# Patient Record
Sex: FEMALE
[De-identification: names, ages, dates, MRNs, and addresses within clinical notes are randomized per-mention and may not be internally consistent; named-entity substitution may affect disease eponyms.]

---

## 2019-03-15 ENCOUNTER — HOSPITAL ENCOUNTER (EMERGENCY)
Dept: HOSPITAL 97 - ER | Age: 59
Discharge: HOME | End: 2019-03-15
Payer: COMMERCIAL

## 2019-03-15 DIAGNOSIS — M18.11: Primary | ICD-10-CM

## 2019-03-15 DIAGNOSIS — J45.909: ICD-10-CM

## 2019-03-15 PROCEDURE — 93005 ELECTROCARDIOGRAM TRACING: CPT

## 2019-03-15 PROCEDURE — 99283 EMERGENCY DEPT VISIT LOW MDM: CPT

## 2019-03-15 PROCEDURE — 96372 THER/PROPH/DIAG INJ SC/IM: CPT

## 2019-03-15 NOTE — ER
Nurse's Notes                                                                                     

 [facility]                                                                

Name: Kiki Lockhart                                                                                

Age: 59 yrs                                                                                       

Sex: Female                                                                                       

: 1960                                                                                   

MRN: [id number]                                                                                   

Arrival Date: 03/15/2019                                                                          

Time: 20:03                                                                                       

Account#: [id number]                                                                            

Bed 13                                                                                            

Private MD:                                                                                       

Diagnosis: Osteoarthritis of first carpometacarpal joint, unspecified                             

                                                                                                  

Presentation:                                                                                     

03/15                                                                                             

20:07 Presenting complaint: Patient states: "yesterday I woke up and my arm was hurting in my aj1 

      right wrist. My  gave me this wrist thing (splint) but it's still                    

      hurting."Denies injury to right hand. Transition of care: patient was not received from     

      another setting of care. Onset of symptoms was 2019. Risk Assessment: Do you      

      want to hurt yourself or someone else? Patient reports no desire to harm self or            

      others. Initial Sepsis Screen: Does the patient meet any 2 criteria? HR > 90 bpm. No.       

      Patient's initial sepsis screen is negative. Does the patient have a suspected source       

      of infection? No. Patient's initial sepsis screen is negative. Care prior to arrival:       

      None.                                                                                       

20:07 Method Of Arrival: Ambulatory                                                           aj1 

20:07 Acuity: ANDRE 4                                                                           aj1 

                                                                                                  

Triage Assessment:                                                                                

20:10 General: Appears in no apparent distress. uncomfortable, Behavior is calm, cooperative, aj1 

      appropriate for age. Pain: Complains of pain in dorsal aspect of right wrist and palmar     

      aspect of right wrist Pain currently is 10 out of 10 on a pain scale. Neuro: Level of       

      Consciousness is awake, alert, obeys commands. Cardiovascular: Patient's skin is warm       

      and dry. Respiratory: Airway is patent Respiratory effort is even, unlabored,               

      Respiratory pattern is regular, symmetrical.                                                

                                                                                                  

Historical:                                                                                       

- Allergies:                                                                                      

20:10 No Known Allergies;                                                                     aj1 

- Home Meds:                                                                                      

20:10 Advair Diskus Inhl [Active]; Singulair Oral [Active];                                   aj1 

- PMHx:                                                                                           

20:10 Asthma;                                                                                 aj1 

                                                                                                  

- Immunization history:: Flu vaccine is not up to date.                                           

- Social history:: Smoking status: Patient/guardian denies using tobacco.                         

- Ebola Screening: : Patient denies travel to an Ebola-affected area in the 21 days               

  before illness onset.                                                                           

                                                                                                  

                                                                                                  

Screenin:20 Abuse screen: Denies threats or abuse. Nutritional screening: No deficits noted.        jb4 

      Tuberculosis screening: No symptoms or risk factors identified. Fall Risk None              

      identified.                                                                                 

                                                                                                  

Assessment:                                                                                       

20:20 General: Appears in no apparent distress. uncomfortable, Behavior is calm, cooperative, jb4 

      appropriate for age. Pain: Complains of pain in right wrist and thumb Pain radiates to      

      right hand Pain currently is 8 out of 10 on a pain scale. Neuro: Level of Consciousness     

      is awake, alert, obeys commands, Oriented to person, place, time, situation.                

      Cardiovascular: Patient's skin is warm and dry. Respiratory: Airway is patent               

      Respiratory effort is even, unlabored, Respiratory pattern is regular, symmetrical. GI:     

      No signs and/or symptoms were reported involving the gastrointestinal system. : No        

      signs and/or symptoms were reported regarding the genitourinary system. EENT: No signs      

      and/or symptoms were reported regarding the EENT system. Derm: Skin is intact, Skin is      

      pink, warm \T\ dry. Musculoskeletal: Circulation, motion, and sensation intact.             

21:12 Reassessment: Patient appears in no apparent distress at this time. Patient and/or      jb4 

      family updated on plan of care and expected duration. Pain level reassessed. Patient is     

      alert, oriented x 3, equal unlabored respirations, skin warm/dry/pink.                      

                                                                                                  

Vital Signs:                                                                                      

20:10  / 92; Pulse 92; Resp 18; Temp 98.3(O); Pulse Ox 97% on R/A; Weight 107.05 kg     aj1 

      (R); Height 5 ft. 5 in. (165.10 cm) (R); Pain 10/10;                                        

21:12  / 68; Pulse 76; Resp 18; Pulse Ox 96% on R/A;                                    jb4 

20:10 Body Mass Index 39.27 (107.05 kg, 165.10 cm)                                            aj1 

                                                                                                  

ED Course:                                                                                        

20:03 Patient arrived in ED.                                                                  am2 

20:09 Triage completed.                                                                       aj1 

20:10 Arm band placed on Patient placed in an exam room.                                      aj1 

20:12 [facility]                                                        snw 

20:12 Harrison Cosby MD is Attending Physician.                                              snw 

20:19 Roxanna Hammer, RN is Primary Nurse.                                                   aj1 

20:20 Patient has correct armband on for positive identification. Bed in low position. Call   jb4 

      light in reach. Side rails up X 1.                                                          

21:09 Primary Nurse role handed off by Roxanna Hammer, RN                                    jb4 

21:09 Trevon Teran, RN is Primary Nurse.                                                     jb4 

21:12 X-ray completed. Portable x-ray completed in exam room. Patient tolerated procedure     ml  

      well.                                                                                       

21:13 Wrist Right 3 View XRAY In Process Unspecified.                                         EDMS

21:34 No provider procedures requiring assistance completed. Patient did not have IV access   jb4 

      during this emergency room visit.                                                           

                                                                                                  

Administered Medications:                                                                         

20:35 Drug: TORadol 60 mg Route: IM; Site: left gluteus;                                      aj1 

21:14 Follow up: Response: No adverse reaction; Pain is decreased                             jb4 

20:35 Drug: Norco 5 mg-325 mg 1 tabs Route: PO;                                               aj1 

21:13 Follow up: Response: No adverse reaction                                                jb4 

                                                                                                  

                                                                                                  

Outcome:                                                                                          

21:10 Discharge ordered by MD.                                                                snw 

21:34 Discharged to home ambulatory, with family.                                             jb4 

21:34 Condition: stable                                                                           

21:34 Discharge instructions given to patient, family, Instructed on discharge instructions,      

      follow up and referral plans. medication usage, Demonstrated understanding of               

      instructions, follow-up care, medications, Prescriptions given X 1.                         

21:36 Patient left the ED.                                                                    jb4 

                                                                                                  

Signatures:                                                                                       

Dispatcher [employer]                           EDMS                                                 

Roxanna Hammer, RN                     RN   aj1                                                  

Charu Alarcon, MICHAP-C                 FNP-[place] Sevilla James, RN                       RN   jb4                                                  

Aiyana Andrew                                                  

                                                                                                  

**************************************************************************************************

## 2019-03-15 NOTE — EDPHYS
Physician Documentation                                                                           

 [facility]                                                                

Name: Kiki Lockhart                                                                                

Age: 59 yrs                                                                                       

Sex: Female                                                                                       

: 1960                                                                                   

MRN: [id number]                                                                                   

Arrival Date: 03/15/2019                                                                          

Time: 20:03                                                                                       

Account#: [id number]                                                                            

Bed 13                                                                                            

Private MD:                                                                                       

ED Physician Harrison Cosby                                                                       

HPI:                                                                                              

03/15                                                                                             

20:21 This 59 yrs old  Female presents to ER via Ambulatory with complaints of Hand   snw 

      Pain.                                                                                       

20:21 The patient or guardian reports decreased range of motion, pain, weakness. The          snw 

      complaints affect the CMC of right thumb. Context: The problem was sustained at an          

      unknown location, resulted from a repetitive motion, works in tool room . Onset: The        

      symptoms/episode began/occurred suddenly, today, and became worse and became                

      persistent. Associated signs and symptoms: The patient has no apparent associated signs     

      or symptoms. Severity of symptoms: At their worst the symptoms were moderate. The           

      patient has not experienced similar symptoms in the past. It is unknown whether or not      

      the patient has recently seen a physician.                                                  

                                                                                                  

Historical:                                                                                       

- Allergies:                                                                                      

20:10 No Known Allergies;                                                                     aj1 

- Home Meds:                                                                                      

20:10 Advair Diskus Inhl [Active]; Singulair Oral [Active];                                   aj1 

- PMHx:                                                                                           

20:10 Asthma;                                                                                 aj1 

                                                                                                  

- Immunization history:: Flu vaccine is not up to date.                                           

- Social history:: Smoking status: Patient/guardian denies using tobacco.                         

- Ebola Screening: : Patient denies travel to an Ebola-affected area in the 21 days               

  before illness onset.                                                                           

                                                                                                  

                                                                                                  

ROS:                                                                                              

20:20 Constitutional: Negative for fever, chills, and weight loss, Eyes: Negative for injury, snw 

      pain, redness, and discharge, ENT: Negative for injury, pain, and discharge, Neck:          

      Negative for injury, pain, and swelling, Cardiovascular: Negative for chest pain,           

      palpitations, and edema, Respiratory: Negative for shortness of breath, cough,              

      wheezing, and pleuritic chest pain, Abdomen/GI: Negative for abdominal pain, nausea,        

      vomiting, diarrhea, and constipation, Back: Negative for injury and pain, : Negative      

      for injury, bleeding, discharge, and swelling, MS/Extremity: Negative for injury and        

      deformity, + right thumb/wrist pain, decreased range of motion, decreased strength          

      secondary to pain Skin: Negative for injury, rash, and discoloration, Neuro: Negative       

      for headache, weakness, numbness, tingling, and seizure.                                    

                                                                                                  

Exam:                                                                                             

20:19 Head/Face:  Normocephalic, atraumatic. Eyes:  Pupils equal round and reactive to light, snw 

      extra-ocular motions intact.  Lids and lashes normal.  Conjunctiva and sclera are           

      non-icteric and not injected.  Cornea within normal limits.  Periorbital areas with no      

      swelling, redness, or edema. ENT:  Nares patent. No nasal discharge, no septal              

      abnormalities noted.  Tympanic membranes are normal and external auditory canals are        

      clear.  Oropharynx with no redness, swelling, or masses, exudates, or evidence of           

      obstruction, uvula midline.  Mucous membranes moist. Neck:  Trachea midline, no             

      thyromegaly or masses palpated, and no cervical lymphadenopathy.  Supple, full range of     

      motion without nuchal rigidity, or vertebral point tenderness.  No Meningismus.             

      Chest/axilla:  Normal chest wall appearance and motion.  Nontender with no deformity.       

      No lesions are appreciated. Cardiovascular:  Regular rate and rhythm with a normal S1       

      and S2.  No gallops, murmurs, or rubs.  Normal PMI, no JVD.  No pulse deficits.             

      Respiratory:  Lungs have equal breath sounds bilaterally, clear to auscultation and         

      percussion.  No rales, rhonchi or wheezes noted.  No increased work of breathing, no        

      retractions or nasal flaring. Abdomen/GI:  Soft, non-tender, with normal bowel sounds.      

      No distension or tympany.  No guarding or rebound.  No evidence of tenderness               

      throughout. Back:  No spinal tenderness.  No costovertebral tenderness.  Full range of      

      motion. Skin:  Warm, dry with normal turgor.  Normal color with no rashes, no lesions,      

      and no evidence of cellulitis. Neuro:  Awake and alert, GCS 15, oriented to person,         

      place, time, and situation.  Cranial nerves II-XII grossly intact.  Motor strength 5/5      

      in all extremities.  Sensory grossly intact.  Cerebellar exam normal.  Normal gait.         

      Psych:  Awake, alert, with orientation to person, place and time.  Behavior, mood, and      

      affect are within normal limits.                                                            

20:19 Constitutional: The patient appears alert, awake, anxious.                                  

20:19 Musculoskeletal/extremity: Extremities: grossly normal except: noted in the dorsal          

      aspect of right wrist: decreased ROM, pain, ROM: limited active range of motion due to      

      pain, in the dorsal aspect of right wrist, Circulation is intact in all extremities.        

      Sensation intact. Compartment Syndrome exam of affected extremity: is normal.               

                                                                                                  

Vital Signs:                                                                                      

20:10  / 92; Pulse 92; Resp 18; Temp 98.3(O); Pulse Ox 97% on R/A; Weight 107.05 kg     aj1 

      (R); Height 5 ft. 5 in. (165.10 cm) (R); Pain 10/10;                                        

21:12  / 68; Pulse 76; Resp 18; Pulse Ox 96% on R/A;                                    jb4 

20:10 Body Mass Index 39.27 (107.05 kg, 165.10 cm)                                            aj1 

                                                                                                  

MDM:                                                                                              

20:14 Patient medically screened.                                                             snw 

21:11 Data reviewed: vital signs, nurses notes. Data interpreted: Pulse oximetry: on room air snw 

      is 97 %. Counseling: I had a detailed discussion with the patient and/or guardian           

      regarding: the historical points, exam findings, and any diagnostic results supporting      

      the discharge/admit diagnosis, radiology results, the need for outpatient follow up, to     

      return to the emergency department if symptoms worsen or persist or if there are any        

      questions or concerns that arise at home. Special discussion: I have referred the           

      patient to see his PCP for further evaluation of high blood pressure. Based on the          

      history and exam findings, there is no indication for further emergent testing or           

      inpatient evaluation. I discussed with the patient/guardian the need to see the             

      orthopedic surgeon for further evaluation of the symptoms.                                  

                                                                                                  

03/15                                                                                             

20:18 Order name: Wrist Right 3 View XRAY                                                     snw 

03/15                                                                                             

20:19 Order name: Thumb Spica Splint: right; Complete Time: 20:34                             snw 

03/15                                                                                             

21:25 Order name: EKG; Complete Time: 21:25                                                   snw 

03/15                                                                                             

21:25 Order name: EKG - Nurse/Tech; Complete Time: 21:33                                      snw 

                                                                                                  

Administered Medications:                                                                         

20:35 Drug: TORadol 60 mg Route: IM; Site: left gluteus;                                      aj1 

21:14 Follow up: Response: No adverse reaction; Pain is decreased                             jb4 

20:35 Drug: Norco 5 mg-325 mg 1 tabs Route: PO;                                               aj1 

21:13 Follow up: Response: No adverse reaction                                                jb4 

                                                                                                  

                                                                                                  

Disposition:                                                                                      

03/15/19 21:10 Discharged to Home. Impression: Osteoarthritis of first carpometacarpal joint,     

  unspecified.                                                                                    

- Condition is Stable.                                                                            

- Discharge Instructions: Arthritis, Cast or Splint Care, Adult, Cryotherapy, Heat                

  Therapy.                                                                                        

- Prescriptions for Mobic 7.5 mg Oral Tablet - take 1 tablet by ORAL route once daily             

  take with food; 20 tablet.                                                                      

- Work release form, Medication Reconciliation Form, Thank You Letter, Antibiotic                 

  Education, Prescription Opioid Use form.                                                        

- Follow up: Private Physician; When: 2 - 3 days; Reason: Recheck today's complaints,             

  Continuance of care, Re-evaluation by your physician. Follow up: Emergency                      

  Department; When: As needed; Reason: Worsening of condition.                                    

                                                                                                  

                                                                                                  

                                                                                                  

Addendum:                                                                                         

2019                                                                                        

     06:46 Co-signature as Attending Physician, Harrison Cosby MD I agree with the assessment and   k
dr

           plan of care.                                                                          

                                                                                                  

Signatures:                                                                                       

Dispatcher [employer]                           EDRoxanna Goodman, RN                     RN   aj1                                                  

Harrison Cosby MD MD   [facility]                                                  

Charu Alarcon, FNP-C                 FNP-Csnw                                                  

Trevon Teran, RN                       RN   jb4                                                  

                                                                                                  

Corrections: (The following items were deleted from the chart)                                    

03/15                                                                                             

21:36 21:10 03/15/2019 21:10 Discharged to Home. Impression: Osteoarthritis of first          jb4 

      carpometacarpal joint, unspecified. Condition is Stable. Forms are Medication               

      Reconciliation Form, Thank You Letter, Antibiotic Education, Prescription Opioid Use.       

      Follow up: Private Physician; When: 2 - 3 days; Reason: Recheck today's complaints,         

      Continuance of care, Re-evaluation by your physician. Follow up: Emergency Department;      

      When: As needed; Reason: Worsening of condition. snw                                        

                                                                                                  

**************************************************************************************************

## 2019-03-16 NOTE — RAD REPORT
EXAM DESCRIPTION:  RAD - Wrist Right 3 View - 3/15/2019 9:20 pm

 

CLINICAL HISTORY:  PAIN

Pain

 

COMPARISON:  No comparisons

 

FINDINGS:  Subtle lucency is seen in the region of the scaphoid neck. If patient has snuffbox tendern
ess and a history of trauma, scaphoid fracture would be possible.  Otherwise, no significant bone or 
joint abnormality detected.

## 2019-03-16 NOTE — EKG
Test Date:    2019-03-15               Test Time:    21:28:44

Technician:   MARKUS                                    

                                                     

MEASUREMENT RESULTS:                                       

Intervals:                                           

Rate:         72                                     

CO:           136                                    

QRSD:         88                                     

QT:           372                                    

QTc:          407                                    

Axis:                                                

P:            46                                     

CO:           136                                    

QRS:          69                                     

T:            50                                     

                                                     

INTERPRETIVE STATEMENTS:                                       

                                                     

Normal sinus rhythm

Normal ECG

Compared to ECG 11/16/2010 14:18:28

No significant changes



Electronically Signed On 03-16-19 09:21:14 CDT by Foreign Hinds

## 2020-11-30 NOTE — XMS REPORT
Summary of Care

                          Created on:2020



Patient:Kiki Lockhart

Sex:Female

:1960

External Reference #:[id number]





Demographics







                          Address                   [address] [place] [address]

 

                          Phone                     [phone]

 

                          Mobile Phone              [phone]

 

                          Home Phone                [phone]

 

                          Email Address             [email or web address]

 

                          Preferred Language        English

 

                          Marital Status            

 

                          Synagogue Affiliation     Unknown

 

                          Race                      White

 

                          Ethnic Group               or 









Author







                          Organization              [facility]

 

                          Address                   [address] [place] [address]









Support







                Name            Relationship    Address         Phone

 

                Carolina Granger  Unavailable     [address]      [phone]



                                                [place] [address] 

 

                No one else per patient Unavailable     Unavailable     Unavaila

ble









Care Team Providers







                    Name                Role                Phone

 

                    Pcp,  Does Not Have A Primary Care Provider [phone]









Reason for Visit







                    Reason              Onset Date          Comments

 

                    Refill Request      2020          







Encounter Details







             Date         Type         Department   Care Team    Description

 

             2020   Refill       [facility] Pediatric and Doctor Unassign

ed, No Refill Request



                                                            Adult Primary Care- 

[place]



                                        Name



                                        



                                                [address] [facility], [address] [place] [address]



                                        



                                                            [address]



                          [place] [address]       



                                                            [place] [address]

[address]



                                                    



                                       [phone]              







Allergies

No Known Allergiesdocumented as of this encounter (statuses as of 2020)



Medications







          Medication Sig       Dispensed Refills   Start Date End Date  Status

 

          fluticasone (FLONASE) Use 2 Sprays in           0                     

        Active



          50 mcg/Actuation nasal each nostril                                   

      



          spray     daily.                                            

 

          montelukast Take 10 mg by           0                             Acti

ve



          (SINGULAIR) 10 mg mouth at bedtime.                                   

      



          tablet                                                      

 

          ADVAIR DISKUS 500-50 Inhale 1 Puff 2           0         2019   

        Active



          mcg/dose inhalation (two) times daily.                                

         



          disk                                                        

 

          fexofenadine HCl Take 1 tablet by           0                         

    Active



          (ALLEGRA ALLERGY ORAL) mouth daily.                                   

      

 

          triamcinolone Apply  to area(s) 15 g      1         2020        

   Active



          acetonide 0.1 % 2 (two) times                                         



          ointmentIndications: daily. Apply to                                  

       



          Dyshidrosis affected region of                                        

 



                    the hand                                          

 

          nystatin 100,000 Apply  to area(s) 15 g      0         2020     

      Active



          unit/gram 2 (two) times                                         



          powderIndications: daily.                                            



          Rash                                                        

 

          albuterol 90 Inhale 2 Puffs 8.5 g     0         2020           A

ctive



          mcg/actuation every 4 (four)                                         



          inhalerIndications: hours as needed                                   

      



          Shortness of breath, for Wheezing or                                  

       



          Suspected COVID-19 Shortness of                                       

  



          virus infection Breath.                                           

 

          fluconazole (DIFLUCAN) Take 1 tablet by 5 tablet  0         2020

           Active



          200 mg    mouth daily.                                         



          tabletIndications:                                                   



          Oral thrush                                                   



documented as of this encounter (statuses as of 2020)



Active Problems







                          Problem                   Noted Date

 

                          Environmental allergies   2020

 

                          Prediabetes               2019

 

                          Low libido                2019

 

                          Chronic vulvitis          2019

 

                          AMIN (dyspnea on exertion) 2019

 

                          Chronic venous insufficiency 2019

 

                          Bilateral lower extremity edema 2019

 

                          Fatigue, unspecified type 2019

 

                          Obesity (BMI 30-39.9)     2019

 

                          Other depression          2019

 

                          Chronic right-sided low back pain without sciatica 

 

                          Nasal polyp               2019



documented as of this encounter (statuses as of 2020)



Social History







             Tobacco Use  Types        Packs/Day    Years Used   Date

 

             Former Smoker Cigarettes                             Quit: 20

04









                Smokeless Tobacco: Never Used                                 









                Alcohol Use     Drinks/Week     oz/Week         Comments

 

                Yes                                             ocassional









                          Sex Assigned at Birth     Date Recorded

 

                          Not on file               



documented as of this encounter



Last Filed Vital Signs

Not on filedocumented in this encounter



Plan of Treatment







                Health Maintenance Due Date        Last Done       Comments

 

                Depression Screening 1974                      

 

                DTaP,Tdap,and Td Vaccines (1 - 1981                      



                Tdap)                                           

 

                COLON CANCER SCREENING ANNUAL 2012                      



                FIT/FOBT                                        

 

                COLON CANCER SCREENING FIT DNA 2012                      



                EVERY 3 YEARS                                   

 

                COLON CANCER SCREENING 2012                      



                SIGMOIDOSCOPY EVERY 5 YEARS                                 

 

                COLONOSCOPY     2012                      

 

                Colorectal Cancer Screening 2012                      

 

                Zoster Recombinant Vaccine 2012                      



                (SHINGRIX) (1 of 2)                                 

 

                Breast Cancer Screening 2020      



                (MAMMOGRAM)                                     

 

                INFLUENZA VACCINE (#1) 2020                      

 

                PAP SMEAR       2022      

 

                HEPATITIS C (HCV) SCREEN Completed       2019      

 

                PNEUMOCOCCAL 0-64 YEARS COMBINED Aged Out                       

 No longer eligible based on



                SERIES                                          patient's age to

 complete



                                                                this topic



documented as of this encounter



Results

Not on filedocumented in this encounter



Visit Diagnoses







                                        Diagnosis

 

                                        Dyshidrosis



documented in this encounter



Insurance







          Payer     Benefit Plan Subscriber ID Effective Dates Phone     Address

   Type



                    / Group                                           

 

          [facility] [id number] 2017-[place] [phone] [address]

[address]   PP[address]



           [place]                            [address]          [id number]



                                        



                                                            [place], [place] 



                                                            [address]     



documented as of this encounter

## 2020-11-30 NOTE — RAD REPORT
EXAM DESCRIPTION:  Faisal Single View11/30/2020 1:49 pm

 

CLINICAL HISTORY:  Chest pain

 

COMPARISON:  2012

 

FINDINGS:   The lungs appear clear of acute infiltrate. The heart is normal size

 

IMPRESSION:   No acute abnormalities displayed

## 2020-11-30 NOTE — XMS REPORT
Summary of Care

                          Created on:2020



Patient:Kiki Lockhart

Sex:Female

:1960

External Reference #:[id number]





Demographics







                          Address                   [address] [place] [address]

 

                          Phone                     [phone]

 

                          Mobile Phone              [phone]

 

                          Home Phone                [phone]

 

                          Email Address             [email or web address]

 

                          Preferred Language        English

 

                          Marital Status            

 

                          Adventist Affiliation     Unknown

 

                          Race                      White

 

                          Ethnic Group               or 









Author







                          Organization              [facility]

 

                          Address                   [address] [place] [address]









Support







                Name            Relationship    Address         Phone

 

                Carolina Granger  Unavailable     [address]      [phone]



                                                [place] [address] 

 

                No one else per patient Unavailable     Unavailable     Unavaila

ble









Care Team Providers







                    Name                Role                Phone

 

                    Pcp,  Does Not Have A Primary Care Provider [phone]









Reason for Visit







                          Reason                    Comments

 

                          Refill Request            







Encounter Details







             Date         Type         Department   Care Team    Description

 

                2020      Refill          [facility] Dalia rangel, Trevon Scott MD



                                        Refill Request



                                                            - [address]



                                        [place] [address]



                                        



                                                            [place] [address]

[address]



                                        [phone] (Fax) 







Allergies

No Known Allergiesdocumented as of this encounter (statuses as of 2020)



Medications







          Medication Sig       Dispensed Refills   Start Date End Date  Status

 

          fluticasone Use 2 Sprays           0                             Activ

e



          (FLONASE) 50 in each                                           



          mcg/Actuation nostril daily.                                         



          nasal spray                                                   

 

          ADVAIR DISKUS Inhale 1 Puff           0         2019           A

ctive



          500-50 mcg/dose 2 (two) times                                         



          inhalation disk daily.                                            

 

          fexofenadine HCl Take 1 tablet           0                            

 Active



          (ALLEGRA ALLERGY by mouth                                          



          ORAL)     daily.                                            

 

          triamcinolone Apply  to 15 g      1         2020           Activ

e



          acetonide 0.1 % area(s) 2                                         



          ointmentIndication (two) times                                        

 



          s: Dyshidrosis daily. Apply                                         



                    to affected                                         



                    region of the                                         



                    hand                                              

 

          nystatin 100,000 Apply  to 15 g      0         2020           Ac

tive



          unit/gram area(s) 2                                         



          powderIndications: (two) times                                        

 



          Rash      daily.                                            

 

          albuterol 90 Inhale 2 Puffs 8.5 g     0         2020           A

ctive



          mcg/actuation every 4 (four)                                         



          inhalerIndications hours as                                          



          : Shortness of needed for                                         



          breath, Suspected Wheezing or                                         



          COVID-19 virus Shortness of                                         



          infection Breath.                                           

 

          fluconazole Take 1 tablet 5 tablet  0         2020           Act

judit



          (DIFLUCAN) 200 mg by mouth                                          



          tabletIndications: daily.                                            



          Oral thrush                                                   

 

          montelukast Take 1 tablet 30 tablet 11        2020           Act

judit



          (SINGULAIR) 10 mg by mouth at                                         



          tabletIndications: bedtime.                                          



          Environmental                                                   



          allergies                                                   

 

          montelukast Take 10 mg by           0                    Disc

ontinued



          (SINGULAIR) 10 mg mouth at                                0         (R

eorder)



          tablet    bedtime.                                          



documented as of this encounter (statuses as of 2020)



Active Problems







                          Problem                   Noted Date

 

                          Environmental allergies   2020

 

                          Prediabetes               2019

 

                          Low libido                2019

 

                          Chronic vulvitis          2019

 

                          AMIN (dyspnea on exertion) 2019

 

                          Chronic venous insufficiency 2019

 

                          Bilateral lower extremity edema 2019

 

                          Fatigue, unspecified type 2019

 

                          Obesity (BMI 30-39.9)     2019

 

                          Other depression          2019

 

                          Chronic right-sided low back pain without sciatica 

 

                          Nasal polyp               2019



documented as of this encounter (statuses as of 2020)



Social History







             Tobacco Use  Types        Packs/Day    Years Used   Date

 

             Former Smoker Cigarettes                             Quit: 20

04









                Smokeless Tobacco: Never Used                                 









                Alcohol Use     Drinks/Week     oz/Week         Comments

 

                Yes                                             ocassional









                          Sex Assigned at Birth     Date Recorded

 

                          Not on file               



documented as of this encounter



Last Filed Vital Signs

Not on filedocumented in this encounter



Plan of Treatment







                Health Maintenance Due Date        Last Done       Comments

 

                Depression Screening 1974                      

 

                DTaP,Tdap,and Td Vaccines (1 - 1981                      



                Tdap)                                           

 

                COLON CANCER SCREENING ANNUAL 2012                      



                FIT/FOBT                                        

 

                COLON CANCER SCREENING FIT DNA 2012                      



                EVERY 3 YEARS                                   

 

                COLON CANCER SCREENING 2012                      



                SIGMOIDOSCOPY EVERY 5 YEARS                                 

 

                COLONOSCOPY     2012                      

 

                Colorectal Cancer Screening 2012                      

 

                Zoster Recombinant Vaccine 2012                      



                (SHINGRIX) (1 of 2)                                 

 

                Breast Cancer Screening 2020      



                (MAMMOGRAM)                                     

 

                INFLUENZA VACCINE (#1) 2020                      

 

                PAP SMEAR       2022      

 

                HEPATITIS C (HCV) SCREEN Completed       2019      

 

                PNEUMOCOCCAL 0-64 YEARS COMBINED Aged Out                       

 No longer eligible based on



                SERIES                                          patient's age to

 complete



                                                                this topic



documented as of this encounter



Results

Not on filedocumented in this encounter



Visit Diagnoses







                                        Diagnosis

 

                                        Environmental allergies - Primary







                                        Allergic rhinitis, cause unspecified



documented in this encounter



Insurance







          Payer     Benefit Plan Subscriber ID Effective Dates Phone     Address

   Type



                    / Group                                           

 

          BCBS OF   [facility] [id number] 2017-[place] [phone] [address]

[address]   PPO/[address]



           [place]                            [address]          [id number]



                                        



                                                            [place], [place] 



                                                            [address]     



documented as of this encounter

## 2020-11-30 NOTE — EDPHYS
Physician Documentation                                                                           

 [facility]                                                                 

Name: Kiki Lockhart                                                                                

Age: 60 yrs                                                                                       

Sex: Female                                                                                       

: 1960                                                                                   

MRN: [id number]                                                                                   

Arrival Date: 2020                                                                          

Time: 13:23                                                                                       

Account#: [id number]                                                                            

Bed 17                                                                                            

Private MD:                                                                                       

ED Physician Nura Munoz                                                                         

HPI:                                                                                              

                                                                                             

13:33 This 60 yrs old  Female presents to ER via Unassigned with complaints of        kb  

      Abdominal Pain.                                                                             

13:33 The patient or guardian reports chest pain that is located primarily in the anterior    kb  

      chest wall, left lower anterior rib. Onset: 3 day(s) ago. The pain does not radiate.        

      Associated signs and symptoms: Pertinent positives: None. The chest pain is described       

      as sharp. Duration: The patient or guardian reports a single episode. Modifying             

      factors: The symptoms are alleviated by nothing. the symptoms are aggravated by deep        

      breath. Severity of pain: At its worst the pain was moderate in the emergency               

      department the pain is unchanged. The patient has not experienced similar symptoms in       

      the past. The patient has not recently seen a physician. Pt reports pain to left lower      

      anterior rib area that started 3 days ago. Reports she is unsure if it is pain from abd     

      or lung. Point tenderness upon exam, no rashes, bruising noted. Denies n/v/d/f/c. No        

      abd tenderness upon exam.                                                                   

                                                                                                  

Historical:                                                                                       

- Allergies:                                                                                      

13:35 No Known Allergies;                                                                     ll1 

- PMHx:                                                                                           

13:30 Asthma;                                                                                 ll1 

- PSHx:                                                                                           

13:35 gastric sleeve;                                                                         ll1 

                                                                                                  

- Immunization history:: Flu vaccine is not up to date.                                           

- Social history:: Smoking status: Patient denies any tobacco usage or history of.                

                                                                                                  

                                                                                                  

ROS:                                                                                              

13:32 Constitutional: Negative for fever, chills, and weight loss, Respiratory: Negative for  kb  

      shortness of breath, cough, wheezing, and pleuritic chest pain, Abdomen/GI: Negative        

      for abdominal pain, nausea, vomiting, diarrhea, and constipation, Back: Negative for        

      injury and pain, MS/Extremity: Negative for injury and deformity, Skin: Negative for        

      injury, rash, and discoloration, Neuro: Negative for headache, weakness, numbness,          

      tingling, and seizure.                                                                      

13:32 Cardiovascular: Positive for chest pain, of the left lower anterior ribs.                   

                                                                                                  

Exam:                                                                                             

13:32 Constitutional:  This is a well developed, well nourished patient who is awake, alert,  kb  

      and in no acute distress. Head/Face:  Normocephalic, atraumatic. Cardiovascular:            

      Regular rate and rhythm with a normal S1 and S2.  No gallops, murmurs, or rubs.  Normal     

      PMI, no JVD.  No pulse deficits. Respiratory:  Lungs have equal breath sounds               

      bilaterally, clear to auscultation and percussion.  No rales, rhonchi or wheezes noted.     

       No increased work of breathing, no retractions or nasal flaring. Abdomen/GI:  Soft,        

      non-tender, with normal bowel sounds.  No distension or tympany.  No guarding or            

      rebound.  No evidence of tenderness throughout. Skin:  Warm, dry with normal turgor.        

      Normal color with no rashes, no lesions, and no evidence of cellulitis. MS/ Extremity:      

      Pulses equal, no cyanosis.  Neurovascular intact.  Full, normal range of motion. Neuro:     

       Awake and alert, GCS 15, oriented to person, place, time, and situation.  Cranial          

      nerves II-XII grossly intact.  Motor strength 5/5 in all extremities.  Sensory grossly      

      intact.  Cerebellar exam normal.  Normal gait.                                              

13:32 Chest/axilla: Inspection: normal, Palpation: tenderness, that is moderate, of the  left     

      lower anterior rib, that totally reproduces the patient's complaints.                       

                                                                                                  

Vital Signs:                                                                                      

13:35  / 71; Pulse 68; Resp 17; Temp 98.0; Pulse Ox 97% on R/A; Weight 90.72 kg; Height ll1 

      5 ft. 6 in. (167.64 cm); Pain 8/10;                                                         

15:24  / 71; Pulse 66; Resp 18; Pulse Ox 99% ;                                          ll1 

13:35 Body Mass Index 32.28 (90.72 kg, 167.64 cm)                                             ll1 

                                                                                                  

MDM:                                                                                              

13:24 Patient medically screened.                                                             kb  

13:31 Data reviewed: vital signs, nurses notes. Data interpreted: Pulse oximetry: on room air kb  

      is 100 %. Interpretation: normal.                                                           

15:01 Counseling: I had a detailed discussion with the patient and/or guardian regarding: the kb  

      historical points, exam findings, and any diagnostic results supporting the                 

      discharge/admit diagnosis, lab results, radiology results, the need for outpatient          

      follow up, a family practitioner, to return to the emergency department if symptoms         

      worsen or persist or if there are any questions or concerns that arise at home.             

                                                                                                  

                                                                                             

14:14 Order name: Basic Metabolic Panel; Complete Time: 15:01                                 kb  

                                                                                             

14:14 Order name: CBC with Diff; Complete Time: 14:46                                         kb  

                                                                                             

13:31 Order name: Chest Single View XRAY; Complete Time: 14:14                                kb  

                                                                                             

14:14 Order name: Hepatic Function; Complete Time: 15:01                                      kb  

                                                                                             

14:14 Order name: Lipase; Complete Time: 15:01                                                kb  

                                                                                             

14:14 Order name: IV Saline Lock; Complete Time: 14:26                                        kb  

                                                                                             

14:14 Order name: Labs collected and sent; Complete Time: 14:26                               kb  

                                                                                                  

Administered Medications:                                                                         

14:37 Drug: TORadol - Ketorolac 15 mg Route: IVP; Site: right antecubital;                    ll1 

15:26 Follow up: Response: No adverse reaction; Pain is decreased; RASS: Alert and Calm (0)   ll1 

                                                                                                  

                                                                                                  

Disposition:                                                                                      

15:55 Co-signature as Attending Physician, Nura Munoz MD.                                    rn  

                                                                                                  

Disposition:                                                                                      

20 15:01 Discharged to Home. Impression: Pleurisy.                                          

- Condition is Stable.                                                                            

- Discharge Instructions: Pleurisy, Easy-to-Read.                                                 

- Prescriptions for Diclofenac Sodium 75 mg Oral Tablet, Delayed Release (E.C.) - take            

  1 tablet by ORAL route 2 times per day As needed; 30 tablet.                                    

- Medication Reconciliation Form, Thank You Letter, Antibiotic Education, Prescription            

  Opioid Use form.                                                                                

- Follow up: Emergency Department; When: As needed; Reason: Worsening of condition.               

  Follow up: Private Physician; When: 2 - 3 days; Reason: Recheck today's complaints,             

  Continuance of care, Re-evaluation by your physician.                                           

                                                                                                  

                                                                                                  

                                                                                                  

Signatures:                                                                                       

Dispatcher [employer]                           [facility]                                                 

Estela Rodney, FNP-C                 FNP-Nura Duff MD MD rn Lewis, Lynsay, RN                       RN   ll1                                                  

                                                                                                  

Corrections: (The following items were deleted from the chart)                                    

15:28 15:01 2020 15:01 Discharged to Home. Impression: Pleurisy. Condition is Stable.   ll1 

      Forms are Medication Reconciliation Form, Thank You Letter, Antibiotic Education,           

      Prescription Opioid Use. Follow up: Emergency Department; When: As needed; Reason:          

      Worsening of condition. Follow up: Private Physician; When: 2 - 3 days; Reason: Recheck     

      today's complaints, Continuance of care, Re-evaluation by your physician. kb                

                                                                                                  

**************************************************************************************************

## 2020-11-30 NOTE — ER
Nurse's Notes                                                                                     

 [facility]                                                                 

Name: Kiki Lockhart                                                                                

Age: 60 yrs                                                                                       

Sex: Female                                                                                       

: 1960                                                                                   

MRN: [id number]                                                                                   

Arrival Date: 2020                                                                          

Time: 13:23                                                                                       

Account#: [id number]                                                                            

Bed 17                                                                                            

Private MD:                                                                                       

Diagnosis: Pleurisy                                                                               

                                                                                                  

Presentation:                                                                                     

                                                                                             

13:35 Chief complaint: Patient states: L lower rib cage pain with deep inspiration for 3      ll1 

      days. Slight cough. No known fever. Coronavirus screen: Client denies travel out of the     

      [place] in the last 14 days. At this time, the client does not indicate any symptoms           

      associated with coronavirus-19. Ebola Screen: Patient denies travel to an                   

      Ebola-affected area in the 21 days before illness onset. Initial Sepsis Screen: Does        

      the patient meet any 2 criteria? No. Patient's initial sepsis screen is negative. Does      

      the patient have a suspected source of infection? Yes: Productive cough/pneumonia. Risk     

      Assessment: Do you want to hurt yourself or someone else? Patient reports no desire to      

      harm self or others. Onset of symptoms was 2020.                               

13:35 Method Of Arrival: Ambulatory                                                           ll1 

13:35 Acuity: ANDRE 4                                                                           ll1 

14:22 Acuity: ANDRE 3                                                                           ss  

                                                                                                  

Triage Assessment:                                                                                

13:37 General: Appears in no apparent distress. Behavior is calm, cooperative.                ll1 

                                                                                                  

Historical:                                                                                       

- Allergies:                                                                                      

13:35 No Known Allergies;                                                                     ll1 

- PMHx:                                                                                           

13:30 Asthma;                                                                                 ll1 

- PSHx:                                                                                           

13:35 gastric sleeve;                                                                         ll1 

                                                                                                  

- Immunization history:: Flu vaccine is not up to date.                                           

- Social history:: Smoking status: Patient denies any tobacco usage or history of.                

                                                                                                  

                                                                                                  

Screenin:36 Abuse screen: Denies threats or abuse. Nutritional screening: No deficits noted.        ll1 

      Tuberculosis screening: No symptoms or risk factors identified. Fall Risk None              

      identified. Total [place] Fall Scale indicates No Risk (0-24 pts).                            

                                                                                                  

Assessment:                                                                                       

13:37 General: Appears uncomfortable, Behavior is calm, cooperative, appropriate for age.     ll1 

      Pain: Complains of pain in L rib cage Pain currently is 8 out of 10 on a pain scale.        

      Quality of pain is described as aching, Aggravated by deep breathing. Neuro: No             

      deficits noted. Cardiovascular: No deficits noted. Respiratory: Reports pain with cough     

      Airway is patent Trachea midline Respiratory effort is even, unlabored, Respiratory         

      pattern is regular, symmetrical, Breath sounds are clear bilaterally. GI: Abdomen is        

      flat, Bowel sounds present X 4 quads. Abd is soft and non tender X 4 quads.                 

14:30 Reassessment: Patient and/or family updated on plan of care and expected duration. Pain ll1 

      level reassessed.                                                                           

15:27 Reassessment: Patient and/or family updated on plan of care and expected duration. Pain ll1 

      level reassessed. Patient is alert, oriented x 3, equal unlabored respirations, skin        

      warm/dry/pink. Patient states feeling better.                                               

                                                                                                  

Vital Signs:                                                                                      

13:35  / 71; Pulse 68; Resp 17; Temp 98.0; Pulse Ox 97% on R/A; Weight 90.72 kg; Height ll1 

      5 ft. 6 in. (167.64 cm); Pain 8/10;                                                         

15:24  / 71; Pulse 66; Resp 18; Pulse Ox 99% ;                                          ll1 

13:35 Body Mass Index 32.28 (90.72 kg, 167.64 cm)                                             ll1 

                                                                                                  

ED Course:                                                                                        

13:23 Patient arrived in ED.                                                                  mr  

13:24 Est[facility] Rod[facility]                                                        kb  

13:24 Nura Munoz MD is Attending Physician.                                                kb  

13:29 Lesa Parikh, RN is Primary Nurse.                                                     ll1 

13:29 Arm band placed on Patient placed in an exam room, on a stretcher.                      ll1 

13:36 Triage completed.                                                                       ll1 

13:37 Patient has correct armband on for positive identification. Bed in low position. Call   ll1 

      light in reach. Side rails up X 1. Pulse ox on. NIBP on.                                    

13:49 Chest Single View XRAY In Process Unspecified.                                          EDMS

14:26 Inserted saline lock: 20 gauge in right antecubital area, using aseptic technique.      mt  

      Blood collected.                                                                            

15:25 IV discontinued, intact, bleeding controlled, No redness/swelling at site. Pressure     ll1 

      dressing applied.                                                                           

15:27 No provider procedures requiring assistance completed.                                  ll1 

                                                                                                  

Administered Medications:                                                                         

14:37 Drug: TORadol - Ketorolac 15 mg Route: IVP; Site: right antecubital;                    ll1 

15:26 Follow up: Response: No adverse reaction; Pain is decreased; RASS: Alert and Calm (0)   ll1 

                                                                                                  

                                                                                                  

Outcome:                                                                                          

15:01 Discharge ordered by MD.                                                                kb  

15:27 Discharged to home ambulatory.                                                          ll1 

15:27 Condition: stable                                                                           

15:27 Discharge instructions given to patient, Instructed on discharge instructions, follow       

      up and referral plans. medication usage, Demonstrated understanding of instructions,        

      follow-up care, medications, Prescriptions given X 1.                                       

15:28 Patient left the ED.                                                                    ll1 

                                                                                                  

Signatures:                                                                                       

Dispatcher [employer]                           EDMS                                                 

Estela Rodney, FNLIAN-C                 FNP-Keith SarabiaaNuris Shelby, RN                      RN   Waleska Montero mt, Lynsay, RN                       RN   ll1                                                  

                                                                                                  

**************************************************************************************************

## 2020-11-30 NOTE — XMS REPORT
Summary of Care

                          Created on:2020



Patient:Kiki Lockhart

Sex:Female

:1960

External Reference #:[id number]





Demographics







                          Address                   [address] [place] [address]

 

                          Phone                     [phone]

 

                          Mobile Phone              [phone]

 

                          Home Phone                [phone]

 

                          Email Address             [email or web address]

 

                          Preferred Language        English

 

                          Marital Status            

 

                          Orthodoxy Affiliation     Unknown

 

                          Race                      White

 

                          Ethnic Group               or 









Author







                          Organization              [facility]

 

                          Address                   [address] [place] [address]









Support







                Name            Relationship    Address         Phone

 

                Carolina Granger  Unavailable     [address]      [phone]



                                                [place] [address] 

 

                No one else per patient Unavailable     Unavailable     Unavaila

ble









Care Team Providers







                    Name                Role                Phone

 

                    Pcp,  Does Not Have A Primary Care Provider [phone]









Reason for Visit







                    Reason              Onset Date          Comments

 

                    Refill Request      2020          







Encounter Details







             Date         Type         Department   Care Team    Description

 

                2020      Refill          [facility] and Trevon Hargrove MD



                                        Refill Request



                                                Adult Primary Care- [address]

[place] 



                                        



                                                            [place] [address]



                                        



                                                [address] [place] [address], [phone]



                                        



                                                            [address]



                          [phone] (Fax)        



                                                            [place] [address]

170



                                                    



                                       [phone]              







Allergies

No Known Allergiesdocumented as of this encounter (statuses as of 2020)



Medications







          Medication Sig       Dispensed Refills   Start Date End Date  Status

 

          fluticasone Use 2 Sprays           0                             Activ

e



          (FLONASE) 50 in each                                           



          mcg/Actuation nostril daily.                                         



          nasal spray                                                   

 

          montelukast Take 10 mg by           0                             Acti

ve



          (SINGULAIR) 10 mg mouth at                                          



          tablet    bedtime.                                          

 

          ADVAIR DISKUS Inhale 1 Puff           0         2019           A

ctive



          500-50 mcg/dose 2 (two) times                                         



          inhalation disk daily.                                            

 

          fexofenadine HCl Take 1 tablet           0                            

 Active



          (ALLEGRA ALLERGY by mouth                                          



          ORAL)     daily.                                            

 

          triamcinolone Apply  to 15 g      1         2020           Activ

e



          acetonide 0.1 % area(s) 2                                         



          ointmentIndication (two) times                                        

 



          s: Dyshidrosis daily. Apply                                         



                    to affected                                         



                    region of the                                         



                    hand                                              

 

          nystatin 100,000 Apply  to 15 g      0         2020           Ac

tive



          unit/gram area(s) 2                                         



          powderIndications: (two) times                                        

 



          Rash      daily.                                            

 

          albuterol 90 Inhale 2 Puffs 8.5 g     0         2020           A

ctive



          mcg/actuation every 4 (four)                                         



          inhalerIndications hours as                                          



          : Shortness of needed for                                         



          breath, Suspected Wheezing or                                         



          COVID-19 virus Shortness of                                         



          infection Breath.                                           

 

          fluconazole Take 1 tablet 5 tablet  0         2020           Act

judit



          (DIFLUCAN) 200 mg by mouth                                          



          tabletIndications: daily.                                            



          Oral thrush                                                   

 

          fluconazole Take 1 tablet 5 tablet  0         2020 Dis

continued



          (DIFLUCAN) 200 mg by mouth                                0         (R

eorder)



          tabletIndications: daily.                                            



          Oral thrush                                                   



documented as of this encounter (statuses as of 2020)



Active Problems







                          Problem                   Noted Date

 

                          Environmental allergies   2020

 

                          Prediabetes               2019

 

                          Low libido                2019

 

                          Chronic vulvitis          2019

 

                          AMIN (dyspnea on exertion) 2019

 

                          Chronic venous insufficiency 2019

 

                          Bilateral lower extremity edema 2019

 

                          Fatigue, unspecified type 2019

 

                          Obesity (BMI 30-39.9)     2019

 

                          Other depression          2019

 

                          Chronic right-sided low back pain without sciatica 

 

                          Nasal polyp               2019



documented as of this encounter (statuses as of 2020)



Social History







             Tobacco Use  Types        Packs/Day    Years Used   Date

 

             Former Smoker Cigarettes                             Quit: 20

04









                Smokeless Tobacco: Never Used                                 









                Alcohol Use     Drinks/Week     oz/Week         Comments

 

                Yes                                             ocassional









                          Sex Assigned at Birth     Date Recorded

 

                          Not on file               



documented as of this encounter



Last Filed Vital Signs

Not on filedocumented in this encounter



Plan of Treatment







                Health Maintenance Due Date        Last Done       Comments

 

                Depression Screening 1974                      

 

                DTaP,Tdap,and Td Vaccines ( - 1981                      



                Tdap)                                           

 

                COLON CANCER SCREENING ANNUAL 2012                      



                FIT/FOBT                                        

 

                COLON CANCER SCREENING FIT DNA 2012                      



                EVERY 3 YEARS                                   

 

                COLON CANCER SCREENING 2012                      



                SIGMOIDOSCOPY EVERY 5 YEARS                                 

 

                COLONOSCOPY     2012                      

 

                Colorectal Cancer Screening 2012                      

 

                Zoster Recombinant Vaccine 2012                      



                (SHINGRIX) (1 of 2)                                 

 

                Breast Cancer Screening 2020      



                (MAMMOGRAM)                                     

 

                INFLUENZA VACCINE (#1) 2020                      

 

                PAP SMEAR       2022      

 

                HEPATITIS C (HCV) SCREEN Completed       2019      

 

                PNEUMOCOCCAL 0-64 YEARS COMBINED Aged Out                       

 No longer eligible based on



                SERIES                                          patient's age to

 complete



                                                                this topic



documented as of this encounter



Results

Not on filedocumented in this encounter



Visit Diagnoses







                                        Diagnosis

 

                                        Oral thrush







                                        Candidiasis of mouth



documented in this encounter



Insurance







          Payer     Benefit Plan Subscriber ID Effective Dates Phone     Address

   Type



                    / Group                                           

 

          BCBS OF   [facility] [id number] 2017-Mayelin [phone] [address]

[address]   PP[address]



           [place]                            [address]          [id number]



                                        



                                                            [place] 



                                                            [address]     



documented as of this encounter

## 2020-11-30 NOTE — XMS REPORT
Continuity of Care Document

                          Created on:2020



Patient:NABIL IBARRA

Sex:Female

:1960

External Reference #:[id number]





Demographics







                          Address                   [address] [place] [address]

 

                          Home Phone                [phone]

 

                          Work Phone                [phone]

 

                          Mobile Phone              [phone]

 

                          Email Address             [email or web address]

 

                          Preferred Language        English

 

                          Marital Status            Unknown

 

                          Sabianist Affiliation     Unknown

 

                          Race                      Unknown

 

                          Additional Race(s)        Unavailable



                                                    White

 

                          Ethnic Group               or 









Author







                          Organization              [facility] [place] [facility]

t

 

                          Address                   [address]



                                                    [place] [address]

 

                          Phone                     [phone]









Support







                Name            Relationship    Address         Phone

 

                FRED           Unavailable     .               [phone]



                                                .               

 

                FRED           Unavailable     .               [phone]



                                                .               

 

                FRED           Unavailable     .               [phone]



                                                [place] [address] 

 

                Elkin           Sibling         Unavailable     [phone]

 

                J Elkin         Sibling         [address]      [phone]



                                                [place] [address] 

 

                one else per patient Unavailable     Unavailable     Unavailable









Care Team Providers







                    Name                Role                Phone

 

                    yTler Arroyo MD Attending Clinician [phone]

 

                    Doctor Unassigned,  Name Attending Clinician Unavailable

 

                    Tiffany HAMILTON,  T       Attending Clinician Unavailable

 

                    Dee SINGH  S       Attending Clinician [phone]

 

                    Provider,  Urgent Care Attending Clinician Unavailable

 

                    CINDY Del Valle       Attending Clinician [phone]

 

                    Piedad MUSEP           Attending Clinician [phone]









Payers







           Payer Name Policy Type Policy Number Effective Date Expiration Date S

ource







Problems

This patient has no known problems.



Allergies, Adverse Reactions, Alerts







       Allergy Allergy Status Severity Reaction(s) Onset  Inactive Treating Comm

ents 

Source



       Name   Type                        Date   Date   Clinician        

 

       No Known DA     Active U             2019                      [facility]



       Allergie                             0-24                        Joanna



       s                                  00:00:                      [facility]



                                          00                          [facility] [place]







Medications

This patient has no known medications.



Procedures

This patient has no known procedures.



Encounters







        Start   End     Encounter Admission Attending Care    Care    Encounter 

Source



        Date/Time Date/Time Type    Type    Clinicians Facility Department ID   

   

 

        2020 Refill          CHELSIE Arroyo    1.2.840.114 [id number]

543 



        00:00:00 00:00:00                 [facility]  350.1.13.10         



                                        Edward  Audi 4.2.7.2.686         



                                                [address] [phone]         



                                                [place] [address]             



                                                Office                  



                                                Building                 



                                                One                     

 

        2020 Refill          Zenaidanayana [facility]    1.2.840.114 [id number]

569 



        00:00:00 00:00:00                 Trevon Huntley 350.1.13.10         



                                        Edaramis Martínez 4.2.7.2.686         



                                                [address] [phone]         



                                                [address]                 

 

        2020 Refill          Doctor  [facility]    1.2.840.114 [id number]

70 



        00:00:00 00:00:00                 Unassigned, [place] 350.1.13.10      

   



                                        [place] Mauricio 4.2.7.2.686         



                                                [place] [phone]         



                                                [address]                 

 

        2020 Letter          MARY Allen    1.2.840.114 [id number]

08 



        00:00:00 00:00:00 (Out)           Mary CHELA SINDI   350.1.13.10         



                                                \[facility]\"" 4.2.7.2.686         



                                                        [phone]         



                                                        019             

 

        2020 Emergency         [facility]    [id number]

7402 



        20:10:00 20:59:00                 Nella Huntley 350.1.13.10         



                                                [place] 4.2.7.2.686         



                                                [place]  [phone]         



                                                        084             

 

        2020 Patient         Doctor  [facility]    1.2.840.114 [id number]

85 



        00:00:00 00:00:00 Secure Msg         Unassigned, [facility]  350.1.13.10    

     



                                        [place] 4.2.7.2.686         



                                                [address] [phone]         



                                                [place] [address]             



                                                Office                  



                                                Building                 



                                                One                     

 

        2020 Urgent          Provider, [facility]    [id number]

1744 



        11:29:55 12:03:59 Care            [facility]  350.1.13.10        

 



                                        Care    [place] 4.2.7.2.686         



                                                [address] [phone]         



                                                [place] [address]             



                                                Office                  



                                                Building                 



                                                One                     

 

        2020 Telephone         Eileen, [facility]    [id number]

6651 



        00:00:00 00:00:00                 Shefali Huntley 350.1.13.10         



                                                [place] 4.2.7.2.686         



                                                [place] [phone]         



                                                nal     044             



                                                Building                 

 

        2020 Patient         Doctor  MARY    1.2.840.114 [id number]

35 



        00:00:00 00:00:00 Secure Msg         Unassigned, SINDI   350.1.13.10    

     



                                        [place] \[facility]\"" 4.2.7.2.686         



                                                        [phone]         



                                                        019             

 

        2020 Office          Piedad,  [facility]    1.2.840.114 [id number]

58 



        16:43:41 17:03:41 Visit           [facility]  350.1.13.10         



                                                Audi 4.2.7.2.686         



                                                [place] [phone]         



                                                [place] [address]             



                                                Office                  



                                                Building                 



                                                One                     







Results







           Test Description Test Time  Test Comments Results    Result Comments 

Source









                    RENAL FUNCTION PANEL 2019-10-25 04:15:00 









                      Test Item  Value      Reference Range Interpretation Comme

nts









             SODIUM (test code = NA) 139 MMOL/L   136-143      N            

 

             POTASSIUM (test code = K) 4.3 MMOL/L   3.5-5.1      N            

 

             CHLORIDE (test code = CL) 103 MMOL/L          N            

 

             CARBON DIOXIDE (test code = 24 mmol/L    24-31        N            



             CO2)                                                

 

             GLUCOSE (test code = GLU) 147 mg/dL           H            

 

             BLOOD UREA NITROGEN (test 11.1 MG/DL   7.0-21.0     N            



             code = BUN)                                         

 

             GLOMERULAR FILTRATION RATE >=60 max estimate >60                   

    The estimated glomerular



             (test code = GFR)                                        filtration

 rate is computed



                                                                 usingpatient ra

ce, age



                                                                 (>18), sex, and

 serum



                                                                 creatinine. If 

anyof the



                                                                 needed data mick

ments are



                                                                 missing the Lab

oratory



                                                                 cannot compute 

an estimation



                                                                 of the glomerul

ar filtration



                                                                 rate.

 

             CREATININE (test code = 0.5 mg/dL    0.8-1.5      L            



             CREAT)                                              

 

             ALBUMIN (test code = ALB) 4.2 G/DL     3.5-5.0      N            

 

             CALCIUM (test code = CA) 8.7 mg/dL    8.8-10.2     L            

 

             PHOSPHOROUS (test code = 3.5 mg/dL    2.7-4.5      N            



             PHOS)                                               



GLUBED2019-10-25 00:22:00





             Test Item    Value        Reference Range Interpretation Comments

 

             GLUBED (test code = GLUBED) 150 MG/DL           H            



GLUBED2019-10-24 18:17:00





             Test Item    Value        Reference Range Interpretation Comments

 

             GLUBED (test code = GLUBED) 148 MG/DL           H            



UR HCG QUAL2019-10-24 12:12:00





             Test Item    Value        Reference Range Interpretation Comments

 

             UR HCG QUAL (test code = HCGQLU) NEGATIVE     NEGATIVE

## 2025-03-11 NOTE — XMS REPORT
Continuity of Care Document



                           Created on: 2025





BLANE IBARRALORIE DELANEYA

External Reference #: [id number]

: 1960

Sex: Female



Demographics





                                        Address             [address] [place]  [address]

 

                                        Home Phone          [phone]

 

                                        Work Phone          [phone]

 

                                        Mobile Phone        [phone]

 

                                        Email Address       [email or web address]

[email or web address]

 

                                        Preferred Language  English

 

                                        Marital Status      Unknown

 

                                        Rastafari Affiliation Unknown

 

                                        Race                Unknown

 

                                        Additional Race(s)  Unavailable

White

 

                                        Ethnic Group        Unknown





Author





                                        Name                Unknown

 

                                        Address             [address]

[place]  [address]

 

                                        [facility]

 

                                        Address             [address]

[address]

[place]  [address]

 

                                        Phone               [phone]





Support





                          Name         Relationship Address      Phone

 

                                FRED NABIL   Personal Relationship .

.

, [place]                                    [phone]

 

                                NABIL IBARRA   Personal Relationship .

.

, [place]                                    [phone]

 

                                SHERYL IBARRA   Personal Relationship .

[place], [place]  [address] [phone]

 

                          Carolina Granger Sibling      Unknown      [phone]

[phone]

 

                                J Carolina Granger Sibling         [address] [place]  [address]                 [phone]

 

                                                    one else per patient, 

No                  Emergency Contact   Unknown             Unavailable

 

                          FRED NELL E            Unknown      [phone]

[phone]





Care Team Providers





                                Care Team Member Name Role            Phone

 

                                SHEFALI ARELLANO Primary Care Physician MARCELO Zayas      Attending Clinician Unavailable

 

                                SHEFALI ARELLANO Attending Clinician Unavailable

 

                                RADIOLOGY       Attending Clinician Unavailable

 

                                GWEN TROY Attending Clinician UnavailGWEN Graham Attending Clinician UnavailJESSICA Patino Attending Clinician Portillo Anders MD, [place]      Attending Clinician Unavailable

 

                                NICK ALEXANDER Attending Clinician UnavailIZZY Blanchard Attending Clinician IZZY Moe Attending Clinician FABIO White Attending Clinician UnavailFABIO Leonard Attending Clinician Mateus parker

 

                                Doctor Unassigned, No Name Attending Clinician U

navailable

 

                                UNKNOWN, ATTENDING Attending Clinician Unavailab

le

 

                                Lab, Ang - Db   Attending Clinician Unavailable

 

                                Unknown, Attending Attending Clinician Shefali Parker Attending Clinician [phone]



 

                                GRANT RODNEY Attending Clinician Unavailable

 

                                GRANT RODNEY Attending Clinician Unavailable

 

                                FILIBERTO MATTHEW  Attending Clinician Unavailable

 

                                Filiberto Stephens S Attending Clinician [phone]

[phone]

 

                                FLORIDA ANDERS         Attending Clinician Unavailable

 

                                LANA SHARPE Attending Clinician Unavailable

 

                                Lana Sharpe MD Attending Clinician [phone] 

 

                                [facility] Attending Clinician UnavailKESHIA Walker Attending Clinician Unavailable

 

                                BRO RICHARD Attending Clinician Unavaila

luz Richard ACNPBro Attending Clinician +

[phone]

 

                                Nurse, [facility] Attending Clinician Un

available

 

                                EbJuanis Warner Attending Clinician [phone]

[phone]

 

                                JUANIS ZARATE  Attending Clinician Unavailable

 

                                Norma Edge MD Attending Clinician +

[phone]

 

                                Keshia Galeana PA-C Attending Clinician [phone]

 

                                EN ABRAMS Attending Clinician Unavailable

 

                                En Abrams MD Attending Clinician [phone]

[phone]

 

                                NurseIsaias   Attending Clinician Unavailable

 

                                BLANCA GRESHAM Attending Clinician Unavailable

 

                                Elio Peter DO Attending Clinician +

[phone]

 

                                ProviderIsaias Urgent Care Attending Clinician Un

available

 

                                Shekhar Bateman Attending Clinician [phone]

[phone]

 

                                SHEKHAR HERBERT  Attending Clinician Unavailable

 

                                NORMA EDGE Attending Clinician Unavailab

ASIA Foote Attending Clinician Unarichie Stewart MD, Jessica Scott Attending Clinician [phone]

 

                                DARREN MAJOR Attending Clinician Unavaila

luz Allen RN, Mary YORK Attending Clinician Unavailab

Nella Greco S Attending Clinician [phone]

[phone]

 

                                Ruslan GUERRA, Adrien BAUTISTA Attending Clinician +

[phone]

 

                                Tech, [employer] Echo Attending Clinician LANA Rubin Admitting Clinician Unavailable

 

                                BRO RICHARD Admitting Clinician Unavaila

EN Pierre Admitting Clinician Unavailable







Payers





                    Payer Name Policy Type Policy Number Effective Date Expirati

on Date Source

 

                          [facility]              [id number] 2017 

00:00:00                                            

 

                                                    [facility] OON                          [id number]           2024 

00:00:00                                            







Problems





                                                    Condition 

Name                                    Condition 

Details                                 Condition 

Category                  Status                    Onset 

Date                                    Resolution 

Date                                    Last 

Treatment 

Date                                    Treating 

Clinician                 Comments                  Source

 

                                                    Routine 

gynecologi

karime 

examinatio

n                                       Routine 

gynecologi

karime 

examinatio

n                   Disease             Active              2022 

00:00:

00                                                               [facility]

 

                                                    Breast 

implant 

status                                  Breast 

implant 

status              Disease             Active              2022 

00:00:

00                                                               [facility]

 

                                                    BMI 

29.0-29.9,

adult                                   BMI 

29.0-29.9,

adult               Disease             Active              2022 

00:00:

00                                                               [facility]

 

                                                    Low TSH 

level                                   Low TSH 

level               Disease             Active              2021 

00:00:

00                                                               [facility]

 

                                                    HSV 

infection                               HSV 

infection           Disease             Active              2021 

00:00:

00                                                               [facility]

 

                                                    Environmen

marianna 

allergies                               Environmen

marianna 

allergies           Disease             Active               

00:00:

00                                                               [facility]

 

                                                    Prediabete

s                                       Prediabete

s                   Disease             Active               

00:00:

00                                                               [facility]

 

                                                    Lichen 

sclerosus 

of female 

genitalia                               Lichen 

sclerosus 

of female 

genitalia           Disease             Active               

00:00:

00                                                               [facility]

 

                      Low libido Low libido Disease    Active      

00:00:

00                                                               [facility]

 

                                                    Chronic 

vulvitis                                Chronic 

vulvitis            Disease             Active               

00:00:

00                                                               [facility]

 

                                                    Bilateral 

lower 

extremity 

edema                                   Bilateral 

lower 

extremity 

edema               Disease             Active               

00:00:

00                                                               [facility]

 

                                                    AMIN 

(dyspnea 

on 

exertion)                               AMIN 

(dyspnea 

on 

exertion)           Disease             Active               

00:00:

00                                                               [facility]

 

                                                    Chronic 

venous 

insufficie

ncy                                     Chronic 

venous 

insufficie

ncy                 Disease             Active               

00:00:

00                                                               [facility]

 

                                                    Fatigue, 

unspecifie

d type                                  Fatigue, 

unspecifie

d type              Disease             Active               

00:00:

00                                                               [facility]

 

                                                    Obesity 

(BMI 

30-39.9)                                Obesity 

(BMI 

30-39.9)            Disease             Active               

00:00:

00                                                               [facility]

 

                                                    Other 

depression                              Other 

depression          Disease             Active               

00:00:

00                                                               [facility]

 

                                                    Chronic 

right-side

d low back 

pain 

without 

sciatica                                Chronic 

right-side

d low back 

pain 

without 

sciatica            Disease             Active               

00:00:

00                                                               [facility]

 

                                                    Nasal 

polyp                                   Nasal 

polyp               Disease             Active               

00:00:

00                                                               [facility]







Allergies, Adverse Reactions, Alerts





                                                    Allergy 

Name                                    Allergy 

Type            Status          Severity        Reaction(s)     Onset 

Date                                    Inactive 

Date                                    Treating 

Clinician                 Comments                  Source

 

                                                    No Known 

Allergie

s            DA           Active       U                         2019 

00:00:

00                                                              [facility]

 

                                                    NO KNOWN 

ALLERGIE

S                                       Drug 

Class   Active                                                  [facility]







Social History





                    Social Habit Start Date Stop Date Quantity  Comments  Source

 

                    Gender identity                                         [facility]

 

                    [facility]ual [facility]                                         [facility]

 

                                                    History SDOH 

Alcohol Frequency                                                     [facility]

 

                                                    History SDOH 

Alcohol Std Drinks                                                     [facility]

 

                                                    History SDOH 

Alcohol Binge                                                     [facility]

 

                                        Alcohol intake      2023 

00:00:00                                2023 

00:00:00                                Current drinker 

of alcohol 

(finding)                                           [facility]

 

                                                    History of Social 

function                                2023 

00:00:00                                2023 

00:00:00                                                    [facility]

 

                                                    Exposure to 

SARS-CoV-2 (event)                      2023 

00:00:00                                2023 

13:50:00            Not sure                                [facility]

 

                                                    Tobacco use and 

exposure                                2022 

00:00:00                                2022 

00:00:00                                Smokeless tobacco 

non-user                                            [facility]

 

                                        Alcohol Comment     2019 

00:00:00                                2019 

00:00:00            ocassional                              [facility]

 

                                                    History of tobacco 

use                                                 2004 

00:00:00            Cigarette Smoker                        [facility]

 

                                                    Sex Assigned At 

Birth                                   1960 

00:00:00                                1960 

00:00:00                                                    [facility]







                          Smoking Status Start Date   Stop Date    Source

 

                          Ex-smoker    2022 00:00:2022 00:00:00 [facility]







Medications





                                                    Ordered 

Medication 

Name                                    Filled 

Medication 

Name                                    Start 

Date                                    Stop 

Date                                    Current 

Medication?                             Ordering 

Clinician       Indication      Dosage          Frequency       Signature 

(SIG)               Comments            Components          Source

 

                                                    estradioL 

0.01 % (0.1 

mg/gram) 

vaginal 

cream                                               2023 

00:00:

00                  Yes                 [id number] 2g                  Insert 2 g

 

into 

vagina 2 

(two) 

times per 

week.                                                       [facility]

 

                                                    clobetasoL 

0.05 % 

cream                                               2023 

00:00:

00                  Yes                 [id number]                     Apply to 

area(s) 2 

(two) 

times 

daily.                                                      [facility]

 

                                                    ketoconazol

e 2 % 

shampoo                                             2023 

00:00:

00                  Yes                 [id number]                      Apply 

shampoo 

2x/week x 

8 weeks, 

then prn. 

Leave on x 

3-5 

minutes 

prior to 

rinsing.                                                    [facility]

 

                                                    albuterol 

90 

mcg/actuati

on inhaler                                          2023 

00:00:

00                  Yes                 [id number]  2{puff}             Inhale 2 

Puffs 

every 6 

(six) 

hours as 

needed for 

Wheezing.                                                   [facility]

 

                                                    azithromyci

n 250 mg 

tablet                                              2023 

00:00:

00                                       

00:00

:00        No                    [id number]                         Take 500 

mg PO day 

1, then 

250 mg PO 

days 2 to 

5                                                           [facility]

 

                                                    methylPREDN

ISolone 

(MEDROL, 

ROBERT,) 4 mg 

tablets                                             2023 

00:00:

00                                       

00:00

:00        No                    [id number]                         Take by 

mouth 

SEE-INSTRU

CTIONS. 

follow 

package 

directions                                                  [facility]

 

                                                    benzonatate 

(TESSALON 

PERLES) 100 

mg capsule                                          2023 

00:00:

00                                       

00:00

:00        No                    [id number]   100mg                 Take 1 

capsule by 

mouth 

every 8 

(eight) 

hours as 

needed for 

Cough.                                                      [facility]

 

                                                    vibegron 

(GEMTESA) 

75 mg Tab                                            

00:00:

00                  Yes                 [id number]  75mg                Take 75 mg

 

by mouth 

in the 

morning.                                                    [facility]

 

                                                    triamcinolo

ne 

acetonide 

(KENALOG) 

injection 

40 mg                                                

16:15:

00                                       

15:25

:00                 No                                      [id number]

9100       40mg                                                   [facility]

 

                                                    naproxen 

(NAPROSYN) 

tablet 500 

mg                                                   

22:30:

00                                       

21:53

:00        No                               500mg                 500 mg, 

Oral, ONCE 

NOW, 1 

dose, On 

23 at 

1730, ASAP                                                  [facility]

 

                                                    phentermine 

37.5 mg 

capsule                                             2023-0

3-03 

13:50:

58                                       

00:00

:00        No                               37.5mg                Take 37.5 

mg by 

mouth 

every 

morning.                                                    [facility]

 

                                                    phentermine 

37.5 mg 

capsule                                              

16:57:

00                  Yes                           37.5mg              Take 37.5 

mg by 

mouth 

every 

morning.                                                    [facility]

 

                                                    azelastine 

137 mcg 

(0.1 %) 

nasal spray                                          

00:00:

00                        Yes                       [id number]     1{spray

}                                                   Use 1 

Spray in 

each 

nostril in 

the 

morning 

and 1 

Spray in 

the 

evening. 

Use in 

each 

nostril as 

directed                                                    [facility]

 

                                                    amoxicillin

-pot 

clavulanate 

500 mg 

(AUGMENTIN) 

500-125 mg 

tablet                                              2022 

00:00:

00                                       

05:59

:00        No                    [id number]  500mg                 Take 1 

tablet by 

mouth in 

the 

morning 

and 1 

tablet at 

noon and 1 

tablet in 

the 

evening. 

Do all 

this for 7 

days.                                                       [facility]

 

                                                    ampicillin 

500 mg 

capsule                                             2022 

00:00:

00                                       

05:59

:00        No                    [id number]  500mg                 Take 1 

capsule by 

mouth 

every 6 

(six) 

hours for 

7 days.                                                     [facility]

 

                                                    clobetasoL 

0.05 % 

cream                                               2022 

00:00:

00                                       

00:00

:00        No                    [id number]                        Apply to 

area(s) 2 

(two) 

times 

daily.                                                      [facility]

 

                                                    phentermine 

37.5 mg 

tablet                                               

00:00:

00                  Yes                           37.5mg              Take 1 

tablet by 

mouth in 

the 

morning.                                                    [facility]

 

                                                    ketorolac 

(TORADOL) 

injection 

30 mg                                                

01:00:

00                                       

23:55

:00        No                               30mg                  30 mg, 

Intramuscu

lar, ONCE, 

1 dose, On 

22 

at 2000, 

Routine                                                     [facility]

 

                                                    phentermine 

37.5 mg 

capsule                                             2021 

16:10:

13                  Yes                           37.5mg              Take 37.5 

mg by 

mouth 

every 

morning.                                                    [facility]

 

                                                    clobetasoL 

0.05 % 

ointment                                            2021 

00:00:

00                                       

00:00

:00        No                    [id number]                          Apply to 

area(s) 2 

(two) 

times 

daily.                                                      [facility]

 

                                                    amoxicillin 

500 mg 

capsule                                             2021 

00:00:

00                                       

00:00

:00        No                    [id number]   500mg                 Take 1 

capsule by 

mouth 3 

(three) 

times 

daily.                                                      [facility]

 

                                                    phenazopyri

dine 200 mg 

tablet                                              2021 

00:00:

00                                       

00:00

:00        No                    [id number]   200mg                 Take 1 

tablet by 

mouth 3 

(three) 

times 

daily.                                                      [facility]

 

                                                    methylPREDN

ISolone 4 

mg tablets                                          2021 

00:00:

00                                       

00:00

:00        No                    [id number]                         Take by 

mouth 

SEE-INSTRU

CTIONS. 

follow 

package 

directions                                                  [facility]

 

                                                    fluticasone 

(FLONASE) 

50 

mcg/Actuati

on nasal 

spray                                               2021 

15:03:

50                                      2021-

10-27 

00:00

:00             No                                              2{spray

}                                                   Use 2 

Sprays in 

each 

nostril 

daily.                                                      [facility]

 

                                                    fexofenadin

e HCl 

(ALLEGRA 

ALLERGY 

ORAL)                                               2021 

15:03:

21                                      2021-

10-27 

00:00

:00        No                               1{tbl}                Take 1 

tablet by 

mouth 

daily.                                                      [facility]

 

                                                    loratadine-

pseudoephed

rine 

(CLARITIN-D 

24 HOUR) 

 mg 

per 24 hr 

tablet                                               

00:00:

00                                       

00:00

:00        No                    [id number]   1{tbl}                Take 1 

tablet by 

mouth 

daily.                                                      [facility]

 

                                                    albuterol 

90 

mcg/actuati

on inhaler                                           

00:00:

00                                       

00:00

:00        No                    [id number]   2{puff}               Inhale 2 

Puffs 

every 4 

(four) 

hours as 

needed for 

Wheezing 

or 

Shortness 

of Breath.                                                  [facility]

 

                                                    benzonatate 

100 mg 

capsule                                              

00:00:

00                                      2021-

10-27 

00:00

:00        No                    [id number]   100mg                 Take 1 

capsule by 

mouth 3 

(three) 

times 

daily as 

needed for 

Cough.                                                      [facility]

 

                                                    montelukast 

(SINGULAIR) 

10 mg 

tablet                                              2021-0

3-11 

00:00:

00                                       

00:00

:00        No                    [id number]  10mg                  Take 1 

tablet by 

mouth 

daily.                                                      [facility]

 

                                                    azelastine-

fluticasone 

(DYMISTA) 

137-50 

mcg/spray 

nasal spray                                         2020 

00:00:

 

00:00

:00             No                              [id number]        1{spray

}                                                   Use 1 

Spray in 

each 

nostril 2 

(two) 

times 

daily. If 

not 

covered, 

Rx 

fluticason

e & Rx 

azelastine 

nasal 

sprays 1 

spray each 

BID, 11 

refills                                                     [facility]

 

                                                    predniSONE 

10 mg 

tablet                                              2020 

00:00:

00                                      2021-

10-27 

00:00

:00        No                    [id number]                        Take 4 tab 

QAM with 

breakfast 

days 1-7, 

3 tab day 

8, 2 tab 

day 9, 1 

tab day 10 

#34 total                                                   [facility]

 

                                                    MONTELUKAST 

10 mg 

tablet                                              2020 

00:00:

00                                      2021-

10-27 

00:00

:00        No                    [id number]                        TAKE 1 

TABLET BY 

MOUTH 

EVERYDAY 

AT BEDTIME                                                  [facility]

 

                                                    montelukast 

(SINGULAIR) 

10 mg 

tablet                                              2020 

10:53:

33                                       

00:00

:00        No                               10mg                  Take 10 mg 

by mouth 

at 

bedtime.                                                    [facility]

 

                                                    diclofenac 

75 mg EC 

tablet                                              2020 

00:00:

00                                      2021-

10-27 

00:00

:00        No                                                     TAKE 1 

TABLET BY 

MOUTH 

TWICE A 

DAY AS 

NEEDED                                                      [facility]

 

                                                    fluconazole 

(DIFLUCAN) 

200 mg 

tablet                                              2020 

00:00:

00                                      2021-

10-27 

00:00

:00        No                    [id number]   200mg                 Take 1 

tablet by 

mouth 

daily.                                                      [facility]

 

                                                    NASCOBAL 

500 

mcg/spray 

Spry                                                

928 

00:00:

00                  Yes                                               USE 1 

SPRAY IN 

NOSTRIL 

EVERY WEEK                                                  [facility]

 

                                                    albuterol 

90 

mcg/actuati

on inhaler                                           

00:00:

00                                       

00:00

:00        No                    [id number]  2{puff}               Inhale 2 

Puffs 

every 4 

(four) 

hours as 

needed for 

Wheezing 

or 

Shortness 

of Breath.                                                  [facility]

 

                                                    nystatin 

100,000 

unit/gram 

powder                                              

8-14 

00:00:

00                                      2021-

10-27 

00:00

:00        No                    [id number]                        Apply to 

area(s) 2 

(two) 

times 

daily.                                                      [facility]

 

                                                    triamcinolo

ne 

acetonide 

0.1 % 

ointment                                             

00:00:

00                                      2021-

10-27 

00:00

:00        No                    [id number]                        Apply to 

area(s) 2 

(two) 

times 

daily. 

Apply to 

affected 

region of 

the hand                                                    [facility]

 

                                                    fluconazole 

(DIFLUCAN) 

200 mg 

tablet                                               

00:00:

 

16:49

:07        No                    [id number]   200mg                 Take 1 

tablet by 

mouth 

daily.                                                      [facility]

 

                                                    ADVAIR 

DISKUS 

500-50 

mcg/dose 

inhalation 

disk                                                

318 

00:00:

00                  Yes                           1{puff}             Inhale 1 

Puff in 

the 

morning 

and 1 Puff 

in the 

evening.                                                    [facility]







Immunizations





                                                    Ordered 

Immunization Name                       Filled 

Immunization Name Date            Status          Comments        Source

 

                                TDAP                            2021-10-27 

00:00:00            Completed                               [facility]

 

                                                    Influenza Virus 

Vaccine Quad IM, 

Preserv and ABX 

Free 6 MO-64 YRS                                    2021-10-27 

00:00:00            Completed                               [facility]

 

                                TDAP                            2021-10-27 

00:00:00            Completed                               [facility]

 

                                                    Influenza Virus 

Vaccine Quad IM, 

Preserv and ABX 

Free 6 MO-64 YRS                                    2021-10-27 

00:00:00            Completed                               [facility]

 

                                TDAP                            2021-10-27 

00:00:00            Completed                               [facility]

 

                                                    Influenza Virus 

Vaccine Quad IM, 

Preserv and ABX 

Free 6 MO-64 YRS                                    2021-10-27 

00:00:00            Completed                               [facility]

 

                                TDAP                            2021-10-27 

00:00:00            Completed                               [facility]

 

                                                    Influenza Virus 

Vaccine Quad IM, 

Preserv and ABX 

Free 6 MO-64 YRS                                    2021-10-27 

00:00:00            Completed                               [facility]

 

                                TDAP                            2021-10-27 

00:00:00            Completed                               [facility]

 

                                                    Influenza Virus 

Vaccine Quad IM, 

Preserv and ABX 

Free 6 MO-64 YRS                                    2021-10-27 

00:00:00            Completed                               [facility]

 

                                TDAP                            2021-10-27 

00:00:00            Completed                               [facility]

 

                                                    Influenza Virus 

Vaccine Quad IM, 

Preserv and ABX 

Free 6 MO-64 YRS                                    2021-10-27 

00:00:00            Completed                               [facility]

 

                                TDAP                            2021-10-27 

00:00:00            Completed                               [facility]

 

                                                    Influenza Virus 

Vaccine Quad IM, 

Preserv and ABX 

Free 6 MO-64 YRS                                    2021-10-27 

00:00:00            Completed                               [facility]

 

                                TDAP                            2021-10-27 

00:00:00            Completed                               [facility]

 

                                                    Influenza Virus 

Vaccine Quad IM, 

Preserv and ABX 

Free 6 MO-64 YRS                                    2021-10-27 

00:00:00            Completed                               [facility]

 

                                TDAP                            2021-10-27 

00:00:00            Completed                               [facility]

 

                                                    Influenza Virus 

Vaccine Quad IM, 

Preserv and ABX 

Free 6 MO-64 YRS                                    2021-10-27 

00:00:00            Completed                               [facility]

 

                                TDAP                            2021-10-27 

00:00:00            Completed                               [facility]

 

                                                    Influenza Virus 

Vaccine Quad IM, 

Preserv and ABX 

Free 6 MO-64 YRS                                    2021-10-27 

00:00:00            Completed                               [facility]

 

                                TDAP                            2021-10-27 

00:00:00            Completed                               [facility]

 

                                                    Influenza Virus 

Vaccine Quad IM, 

Preserv and ABX 

Free 6 MO-64 YRS                                    2021-10-27 

00:00:00            Completed                               [facility]

 

                                TDAP                            2021-10-27 

00:00:00            Completed                               [facility]

 

                                                    Influenza Virus 

Vaccine Quad IM, 

Preserv and ABX 

Free 6 MO-64 YRS                                    2021-10-27 

00:00:00            Completed                               [facility]

 

                                TDAP                            2021-10-27 

00:00:00            Completed                               [facility]

 

                                                    Influenza Virus 

Vaccine Quad IM, 

Preserv and ABX 

Free 6 MO-64 YRS                                    2021-10-27 

00:00:00            Completed                               [facility]

 

                                TDAP                            2021-10-27 

00:00:00            Completed                               [facility]

 

                                                    Influenza Virus 

Vaccine Quad IM, 

Preserv and ABX 

Free 6 MO-64 YRS                                    2021-10-27 

00:00:00            Completed                               [facility]

 

                                TDAP                            2021-10-27 

00:00:00            Completed                               [facility]

 

                                                    Influenza Virus 

Vaccine Quad IM, 

Preserv and ABX 

Free 6 MO-64 YRS                                    2021-10-27 

00:00:00            Completed                               [facility]

 

                                TDAP                            2021-10-27 

00:00:00            Completed                               [facility]

 

                                                    Influenza Virus 

Vaccine Quad IM, 

Preserv and ABX 

Free 6 MO-64 YRS                                    2021-10-27 

00:00:00            Completed                               [facility]

 

                                TDAP                            2021-10-27 

00:00:00            Completed                               [facility]

 

                                                    Influenza Virus 

Vaccine Quad IM, 

Preserv and ABX 

Free 6 MO-64 YRS                                    2021-10-27 

00:00:00            Completed                               [facility]

 

                                TDAP                            2021-10-27 

00:00:00            Completed                               [facility]

 

                                                    Influenza Virus 

Vaccine Quad IM, 

Preserv and ABX 

Free 6 MO-64 YRS                                    2021-10-27 

00:00:00            Completed                               [facility]

 

                                TDAP                            2021-10-27 

00:00:00            Completed                               [facility]

 

                                                    Influenza Virus 

Vaccine Quad IM, 

Preserv and ABX 

Free 6 MO-64 YRS                                    2021-10-27 

00:00:00            Completed                               [facility]

 

                                TDAP                            2021-10-27 

00:00:00            Completed                               [facility]

 

                                                    Influenza Virus 

Vaccine Quad IM, 

Preserv and ABX 

Free 6 MO-64 YRS                                    2021-10-27 

00:00:00            Completed                               [facility]

 

                                TDAP                            2021-10-27 

00:00:00            Completed                               [facility]

 

                                                    Influenza Virus 

Vaccine Quad IM, 

Preserv and ABX 

Free 6 MO-64 YRS                                    2021-10-27 

00:00:00            Completed                               [facility]

 

                                TDAP                            2021-10-27 

00:00:00            Completed                               [facility]

 

                                                    Influenza Virus 

Vaccine Quad IM, 

Preserv and ABX 

Free 6 MO-64 YRS                                    2021-10-27 

00:00:00            Completed                               [facility]

 

                                TDAP                            2021-10-27 

00:00:00            Completed                               [facility]

 

                                                    Influenza Virus 

Vaccine Quad IM, 

Preserv and ABX 

Free 6 MO-64 YRS                                    2021-10-27 

00:00:00            Completed                               [facility]

 

                                                    SARS-COV-2 COVID-19 

PFIZER VACCINE                                      2021-04-10 

00:00:00            Completed                               [facility]

 

                                                    SARS-COV-2 COVID-19 

PFIZER VACCINE                                      2021-04-10 

00:00:00            Completed                               [facility]

 

                                                    SARS-COV-2 COVID-19 

PFIZER VACCINE                                      2021-04-10 

00:00:00            Completed                               [facility]

 

                                                    SARS-COV-2 COVID-19 

PFIZER VACCINE                                      2021-04-10 

00:00:00            Completed                               [facility]

 

                                                    SARS-COV-2 COVID-19 

PFIZER VACCINE                                      2021-04-10 

00:00:00            Completed                               [facility]

 

                                                    SARS-COV-2 COVID-19 

PFIZER VACCINE                                      2021-04-10 

00:00:00            Completed                               [facility]

 

                                                    SARS-COV-2 COVID-19 

PFIZER VACCINE                                      2021-04-10 

00:00:00            Completed                               [facility]

 

                                                    SARS-COV-2 COVID-19 

PFIZER VACCINE                                      2021-04-10 

00:00:00            Completed                               [facility]

 

                                                    SARS-COV-2 COVID-19 

PFIZER VACCINE                                      2021-04-10 

00:00:00            Completed                               [facility]

 

                                                    SARS-COV-2 COVID-19 

PFIZER VACCINE                                      2021-04-10 

00:00:00            Completed                               [facility]

 

                                                    SARS-COV-2 COVID-19 

PFIZER VACCINE                                      2021-04-10 

00:00:00            Completed                               [facility]

 

                                                    SARS-COV-2 COVID-19 

PFIZER VACCINE                                      2021-04-10 

00:00:00            Completed                               [facility]

 

                                                    SARS-COV-2 COVID-19 

PFIZER VACCINE                                      2021-04-10 

00:00:00            Completed                               [facility]

 

                                                    SARS-COV-2 COVID-19 

PFIZER VACCINE                                      2021-04-10 

00:00:00            Completed                               [facility]

 

                                                    SARS-COV-2 COVID-19 

PFIZER VACCINE                                      2021-04-10 

00:00:00            Completed                               [facility]

 

                                                    SARS-COV-2 COVID-19 

PFIZER VACCINE                                      2021-04-10 

00:00:00            Completed                               [facility]

 

                                                    SARS-COV-2 COVID-19 

PFIZER VACCINE                                      2021-04-10 

00:00:00            Completed                               [facility]

 

                                                    SARS-COV-2 COVID-19 

PFIZER VACCINE                                      2021-04-10 

00:00:00            Completed                               [facility]

 

                                                    SARS-COV-2 COVID-19 

PFIZER VACCINE                                      2021-04-10 

00:00:00            Completed                               [facility]

 

                                                    SARS-COV-2 COVID-19 

PFIZER VACCINE                                      2021-04-10 

00:00:00            Completed                               [facility]

 

                                                    SARS-COV-2 COVID-19 

PFIZER VACCINE                                      2021-04-10 

00:00:00            Completed                               [facility]

 

                                                    SARS-COV-2 COVID-19 

PFIZER VACCINE                                      2021-04-10 

00:00:00            Completed                               [facility]

 

                                                    SARS-COV-2 COVID-19 

PFIZER VACCINE                                      2021-04-10 

00:00:00            Completed                               [facility]

 

                                                    SARS-COV-2 COVID-19 

PFIZER VACCINE                                      2021 

00:00:00            Completed                               [facility]

 

                                                    SARS-COV-2 COVID-19 

PFIZER VACCINE                                      2021 

00:00:00            Completed                               [facility]

 

                                                    SARS-COV-2 COVID-19 

PFIZER VACCINE                                      2021 

00:00:00            Completed                               [facility]

 

                                                    SARS-COV-2 COVID-19 

PFIZER VACCINE                                      2021 

00:00:00            Completed                               [facility]

 

                                                    SARS-COV-2 COVID-19 

PFIZER VACCINE                                      2021 

00:00:00            Completed                               [facility]

 

                                                    SARS-COV-2 COVID-19 

PFIZER VACCINE                                      2021 

00:00:00            Completed                               [facility]

 

                                                    SARS-COV-2 COVID-19 

PFIZER VACCINE                                      2021 

00:00:00            Completed                               [facility]

 

                                                    SARS-COV-2 COVID-19 

PFIZER VACCINE                                      2021 

00:00:00            Completed                               [facility]

 

                                                    SARS-COV-2 COVID-19 

PFIZER VACCINE                                      2021 

00:00:00            Completed                               [facility]

 

                                                    SARS-COV-2 COVID-19 

PFIZER VACCINE                                      2021 

00:00:00            Completed                               [facility]

 

                                                    SARS-COV-2 COVID-19 

PFIZER VACCINE                                      2021 

00:00:00            Completed                               [facility]

 

                                                    SARS-COV-2 COVID-19 

PFIZER VACCINE                                      2021 

00:00:00            Completed                               [facility]

 

                                                    SARS-COV-2 COVID-19 

PFIZER VACCINE                                      2021 

00:00:00            Completed                               [facility]

 

                                                    SARS-COV-2 COVID-19 

PFIZER VACCINE                                      2021 

00:00:00            Completed                               [facility]

 

                                                    SARS-COV-2 COVID-19 

PFIZER VACCINE                                      2021 

00:00:00            Completed                               [facility]

 

                                                    SARS-COV-2 COVID-19 

PFIZER VACCINE                                      2021 

00:00:00            Completed                               [facility]

 

                                                    SARS-COV-2 COVID-19 

PFIZER VACCINE                                      2021 

00:00:00            Completed                               [facility]

 

                                                    SARS-COV-2 COVID-19 

PFIZER VACCINE                                      2021 

00:00:00            Completed                               [facility]

 

                                                    SARS-COV-2 COVID-19 

PFIZER VACCINE                                      2021 

00:00:00            Completed                               [facility]

 

                                                    SARS-COV-2 COVID-19 

PFIZER VACCINE                                      2021 

00:00:00            Completed                               [facility]

 

                                                    SARS-COV-2 COVID-19 

PFIZER VACCINE                                      2021 

00:00:00            Completed                               [facility]

 

                                                    SARS-COV-2 COVID-19 

PFIZER VACCINE                                      2021 

00:00:00            Completed                               [facility]

 

                                                    SARS-COV-2 COVID-19 

PFIZER VACCINE                                      2021 

00:00:00            Completed                               [facility]

 

                                                    SARS-COV-2 COVID-19 

PFIZER VACCINE              Unknown      Completed                 [facility]

 

                    TDAP                Unknown   Completed           [facility]

 

                                                    Influenza Virus 

Vaccine Quad IM, 

Preserv and ABX 

Free 6 MO-64 YRS 

(FLUCELVAX)               Unknown      Completed                 [facility]

 

                                                    SARS-COV-2 COVID-19 

PFIZER VACCINE              Unknown      Completed                 [facility]

 

                    TDAP                Unknown   Completed           [facility]

 

                                                    Influenza Virus 

Vaccine Quad IM, 

Preserv and ABX 

Free 6 MO-64 YRS 

(FLUCELVAX)               Unknown      Completed                 [facility]

 

                                                    SARS-COV-2 COVID-19 

PFIZER VACCINE              Unknown      Completed                 [facility]

 

                    TDAP                Unknown   Completed           [facility]

 

                                                    Influenza Virus 

Vaccine Quad IM, 

Preserv and ABX 

Free 6 MO-64 YRS 

(FLUCELVAX)               Unknown      Completed                 [facility]

 

                                                    SARS-COV-2 COVID-19 

PFIZER VACCINE              Unknown      Completed                 [facility]

 

                    TDAP                Unknown   Completed           [facility]

 

                                                    Influenza Virus 

Vaccine Quad IM, 

Preserv and ABX 

Free 6 MO-64 YRS 

(FLUCELVAX)               Unknown      Completed                 [facility]

 

                                                    SARS-COV-2 COVID-19 

PFIZER VACCINE              Unknown      Completed                 [facility]

 

                    TDAP                Unknown   Completed           [facility]

 

                                                    Influenza Virus 

Vaccine Quad IM, 

Preserv and ABX 

Free 6 MO-64 YRS 

(FLUCELVAX)               Unknown      Completed                 [facility]

 

                                                    SARS-COV-2 COVID-19 

PFIZER VACCINE              Unknown      Completed                 [facility]

 

                                                    SARS-COV-2 COVID-19 

PFIZER VACCINE              Unknown      Completed                 [facility]

 

                    TDAP                Unknown   Completed           [facility]

 

                                                    Influenza Virus 

Vaccine Quad IM, 

Preserv and ABX 

Free 6 MO-64 YRS 

(FLUCELVAX)               Unknown      Completed                 [facility]

 

                    TDAP                Unknown   Completed           [facility]

 

                                                    Influenza Virus 

Vaccine Quad IM, 

Preserv and ABX 

Free 6 MO-64 YRS 

(FLUCELVAX)               Unknown      Completed                 [facility]

 

                                                    SARS-COV-2 COVID-19 

PFIZER VACCINE              Unknown      Completed                 [facility]

 

                                                    SARS-COV-2 COVID-19 

PFIZER VACCINE              Unknown      Completed                 [facility]

 

                    TDAP                Unknown   Completed           [facility]

 

                                                    Influenza Virus 

Vaccine Quad IM, 

Preserv and ABX 

Free 6 MO-64 YRS 

(FLUCELVAX)               Unknown      Completed                 [facility]

 

                    TDAP                Unknown   Completed           [facility]

 

                                                    Influenza Virus 

Vaccine Quad IM, 

Preserv and ABX 

Free 6 MO-64 YRS 

(FLUCELVAX)               Unknown      Completed                 [facility]

 

                                                    SARS-COV-2 COVID-19 

PFIZER VACCINE              Unknown      Completed                 [facility]

 

                                                    SARS-COV-2 COVID-19 

PFIZER VACCINE              Unknown      Completed                 [facility]

 

                    TDAP                Unknown   Completed           [facility]

 

                                                    Influenza Virus 

Vaccine Quad IM, 

Preserv and ABX 

Free 6 MO-64 YRS 

(FLUCELVAX)               Unknown      Completed                 [facility]

 

                                                    SARS-COV-2 COVID-19 

PFIZER VACCINE              Unknown      Completed                 [facility]

 

                    TDAP                Unknown   Completed           [facility]

 

                                                    Influenza Virus 

Vaccine Quad IM, 

Preserv and ABX 

Free 6 MO-64 YRS 

(FLUCELVAX)               Unknown      Completed                 [facility]

 

                                                    SARS-COV-2 COVID-19 

PFIZER VACCINE              Unknown      Completed                 [facility]

 

                    TDAP                Unknown   Completed           [facility]

 

                                                    Influenza Virus 

Vaccine Quad IM, 

Preserv and ABX 

Free 6 MO-64 YRS 

(FLUCELVAX)               Unknown      Completed                 [facility]

 

                                                    SARS-COV-2 COVID-19 

PFIZER VACCINE              Unknown      Completed                 [facility]

 

                    TDAP                Unknown   Completed           [facility]

 

                                                    Influenza Virus 

Vaccine Quad IM, 

Preserv and ABX 

Free 6 MO-64 YRS 

(FLUCELVAX)               Unknown      Completed                 [facility]

 

                    TDAP                Unknown   Completed           [facility]

 

                                                    Influenza Virus 

Vaccine Quad IM, 

Preserv and ABX 

Free 6 MO-64 YRS 

(FLUCELVAX)               Unknown      Completed                 [facility]

 

                                                    SARS-COV-2 COVID-19 

PFIZER VACCINE              Unknown      Completed                 [facility]

 

                                                    SARS-COV-2 COVID-19 

PFIZER VACCINE              Unknown      Completed                 [facility]

 

                    TDAP                Unknown   Completed           [facility]

 

                                                    Influenza Virus 

Vaccine Quad IM, 

Preserv and ABX 

Free 6 MO-64 YRS 

(FLUCELVAX)               Unknown      Completed                 [facility]

 

                                                    SARS-COV-2 COVID-19 

PFIZER VACCINE              Unknown      Completed                 [facility]

 

                    TDAP                Unknown   Completed           [facility]

 

                                                    Influenza Virus 

Vaccine Quad IM, 

Preserv and ABX 

Free 6 MO-64 YRS 

(FLUCELVAX)               Unknown      Completed                 [facility]







Vital Signs





                      Vital Name Observation Time Observation Value Comments   S

ource

 

                                                    Systolic blood 

pressure        2023 22:26:00 114 mm[Hg]                      [facility]

 

                                                    Diastolic blood 

pressure        2023 22:26:00 76 mm[Hg]                       [facility]

 

                      Heart rate 2023 22:26:00 88 /min               [facility]

 

                      Body temperature 2023 22:26:00 36.39 Teagan            

 [facility]

 

                      Body height 2023 22:26:00 160 cm                [facility]

 

                      Body weight 2023 22:26:00 88.179 kg             [facility]

[facility]

 

                      BMI        2023 22:26:00 34.44 kg/m2            [facility]

[facility] 

[facility]

 

                                                    Systolic blood 

pressure        2023 20:38:00 135 mm[Hg]                      [place]

[facility]

 

                                                    Diastolic blood 

pressure        2023 20:38:00 74 mm[Hg]                       [facility]

 

                      Heart rate 2023 20:38:00 100 /min              [facility]

[facility] 

[facility]

 

                      Body height 2023 20:38:00 160 cm                [facility]

 

                      Body weight 2023 20:38:00 87.136 kg             [facility]

 

                      BMI        2023 20:38:00 34.03 kg/m2            [facility]

 

                                                    Oxygen saturation in 

Arterial blood by 

Pulse oximetry  2023 20:38:00 99 /min                         [facility]

 

                                                    Systolic blood 

pressure        2023 16:42:00 126 mm[Hg]                      [facility]

 

                                                    Diastolic blood 

pressure        2023 16:42:00 64 mm[Hg]                       [facility]

 

                      Heart rate 2023 16:42:00 93 /min               [facility]

[facility]

 

                      Respiratory rate 2023 16:42:00 20 /min              

 [facility]

 

                      Body height 2023 16:42:00 161 cm                [facility]

 

                      Body weight 2023 16:42:00 88.361 kg             [facility]

 

                      BMI        2023 16:42:00 34.09 kg/m2            [facility]

 

                                                    Oxygen saturation in 

Arterial blood by 

Pulse oximetry  2023 16:42:00 97 /min                         [facility]

 

                                                    Systolic blood 

pressure        2023 14:17:00 114 mm[Hg]                      [facility]

 

                                                    Diastolic blood 

pressure        2023 14:17:00 77 mm[Hg]                       [facility]

 

                      Heart rate 2023 14:17:00 90 /min               [facility]

[facility] 

[facility]

 

                      Body height 2023 14:17:00 162.6 cm              [facility]

 

                      Body weight 2023 14:17:00 84.732 kg             [facility]

[facility]

 

                      BMI        2023 14:17:00 32.06 kg/m2            [facility]

[facility]

 

                                                    Systolic blood 

pressure        2023 18:40:00 144 mm[Hg]                      [facility]

 

                                                    Diastolic blood 

pressure        2023 18:40:00 82 mm[Hg]                       [facility]

 

                      Heart rate 2023 18:40:00 99 /min               [facility]

[facility]

 

                      Body temperature 2023 18:40:00 36.72 Teagan            

 [facility]

 

                      Respiratory rate 2023 18:40:00 20 /min              

 [facility]

 

                      Body height 2023 18:40:00 162.6 cm              [facility]

[facility]

 

                      Body weight 2023 18:40:00 81.647 kg             [facility]

 

                      BMI        2023 18:40:00 30.90 kg/m2            [facility]

 

                                                    Oxygen saturation in 

Arterial blood by 

Pulse oximetry  2023 18:40:00 100 /min                        [facility]

 

                                                    Systolic blood 

pressure        2023 19:48:00 130 mm[Hg]                      [facility]

 

                                                    Diastolic blood 

pressure        2023 19:48:00 76 mm[Hg]                       [facility]

 

                      Heart rate 2023 19:48:00 83 /min               [facility]

[facility]

 

                      Body temperature 2023 19:48:00 36.11 Teagan            

 [facility]

 

                      Respiratory rate 2023 19:48:00 16 /min              

 [facility]

 

                      Body height 2023 19:48:00 161.3 cm              [facility]

[facility]

 

                      Body weight 2023 19:48:00 84.006 kg             [facility]

 

                      BMI        2023 19:48:00 32.29 kg/m2            [facility]

 

                                                    Oxygen saturation in 

Arterial blood by 

Pulse oximetry  2023 19:48:00 98 /min         room air        [facility]

 

                                                    Systolic blood 

pressure        2023 22:49:00 125 mm[Hg]                      [facility]

 

                                                    Diastolic blood 

pressure        2023 22:49:00 78 mm[Hg]                       [facility]

 

                      Heart rate 2023 22:49:00 83 /min               [facility]

[facility]

 

                      Body height 2023 22:49:00 167.6 cm              [facility]

 

                      Body weight 2023 22:49:00 81.647 kg             [facility]

 

                      BMI        2023 22:49:00 29.05 kg/m2            [facility]

 

                                                    Oxygen saturation in 

Arterial blood by 

Pulse oximetry  2023 22:49:00 99 /min                         [facility]

 

                                                    Systolic blood 

pressure        2022 17:31:00 138 mm[Hg]                      [facility]

 

                                                    Diastolic blood 

pressure        2022 17:31:00 89 mm[Hg]                       [facility]

 

                      Heart rate 2022 17:31:00 68 /min               [facility]

[facility]

 

                      Body temperature 2022 17:31:00 36.72 Teagan            

 [facility]

 

                      Respiratory rate 2022 17:31:00 18 /min              

 [facility]

 

                      Body height 2022 17:31:00 167.6 cm              [facility]

 

                      Body weight 2022 17:31:00 82.101 kg             [facility]

 

                      BMI        2022 17:31:00 29.21 kg/m2            [facility]

 

                                                    Systolic blood 

pressure        2022 01:06:00 113 mm[Hg]                      [facility]

 

                                                    Diastolic blood 

pressure        2022 01:06:00 81 mm[Hg]                       [facility]

 

                      Heart rate 2022 01:06:00 83 /min               [facility]

[facility]

 

                      Respiratory rate 2022 01:06:00 16 /min              

 [facility]

 

                                                    Oxygen saturation in 

Arterial blood by 

Pulse oximetry  2022 01:06:00 98 /min                         [facility]

 

                      Body temperature 2022 23:46:00 36.89 Teagan            

 [facility]

 

                      Body weight 2022 23:46:00 72.576 kg             [facility]

[facility]

 

                      BMI        2022 23:46:00 25.82 kg/m2            [facility]

[facility]

 

                                                    Systolic blood 

pressure        2022 23:27:00 137 mm[Hg]                      [facility]

 

                                                    Diastolic blood 

pressure        2022 23:27:00 85 mm[Hg]                       [facility]

 

                      Heart rate 2022 23:27:00 88 /min               [facility]

[facility]

 

                      Body temperature 2022 23:27:00 36.67 Teagan            

 [facility]

 

                      Respiratory rate 2022 23:27:00 18 /min              

 [facility]

 

                      Body height 2022 23:27:00 167.6 cm              [facility]

 

                      Body weight 2022 23:27:00 74.39 kg              [facility]

[facility]

 

                      BMI        2022 23:27:00 26.47 kg/m2            [facility]

 

                                                    Oxygen saturation in 

Arterial blood by 

Pulse oximetry  2022 23:27:00 100 /min                        [facility]

 

                                                    Systolic blood 

pressure        2021 21:42:00 132 mm[Hg]                      [facility]

 

                                                    Diastolic blood 

pressure        2021 21:42:00 81 mm[Hg]                       [facility]

 

                      Heart rate 2021 21:42:00 80 /min               [facility]

[facility]

 

                      Body temperature 2021 21:42:00 36.72 Teagan            

 [facility]

 

                      Respiratory rate 2021 21:42:00 18 /min              

 [facility]

 

                      Body height 2021 21:42:00 167.6 cm              [facility]

[facility]

 

                      Body weight 2021 21:42:00 83.462 kg             [facility]

 

                      BMI        2021 21:42:00 29.70 kg/m2            [facility]







Procedures





                                        Procedure           Date / Time 

Performed                 Performing Clinician      Source

 

                                                    CONSENT/REFUSAL FOR 

DIAGNOSIS AND TREATMENT   2023 22:19:21       Doctor Unassigned, No 

Name                                    [facility]

 

                          XR CHEST 2 VW 2023 18:09:23 Shefali Arellano [facility]

 

                                                    POCT URINALYSIS W/O 

SPECIFIC GRAVITY    2023 19:26:00 Grant Rodney     [facility]

 

                                PATIENT QUESTIONNAIRE 2023 05:01:00 Doctor

 Unassigned, No 

Name                                    [facility]

 

                          XR KNEE <3 VW RIGHT 2023 20:28:46 Lana Sharpe [facility]

 

                                ASSIGNMENT OF BENEFITS 2023 20:08:20 Docshivam

r Unassigned, No 

Name                                    [facility]

 

                                                    CONSENT/REFUSAL FOR 

DIAGNOSIS AND TREATMENT   2023 18:30:04       Doctor Unassigned, No 

Name                                    [facility]

 

                                                    REFERRAL- 

REQUEST/RESPONSE          2023 05:01:00       Doctor Unassigned, No 

Name                                    [facility]

 

                          FREE T4      2023 20:08:00 Shefali Arellano [facility]

 

                                                    THYROID STIMULATING 

HORMONE             2023 20:08:00 Shefali Arellano    [facility]

 

                          CBC WITH DIFF 2023 20:08:00 Shefali Arellano [facility]

 

                          FREE T3      2023 20:08:00 Shefali Arellano [facility]

 

                                ASSIGNMENT OF BENEFITS 2023 18:54:21 Docshivam

r Unassigned, No 

Name                                    [facility]

 

                                                    CONSENT/REFUSAL FOR 

DIAGNOSIS AND TREATMENT   2022 17:19:22       Doctor Unassigned, No 

Name                                    [facility]

 

                          XR CHEST 2 VW 2022 00:08:37 Bro Richard 

[facility]

 

                                                    NOTICE OF PRIVACY 

PRACTICES                 2022 23:40:32       Doctor Unassigned, No 

Name                                    [facility]

 

                                                    CONSENT/REFUSAL FOR 

DIAGNOSIS AND TREATMENT   2022 23:40:09       Doctor Unassigned, No 

Name                                    [facility]







Encounters





                                                    Start 

Date/Time                               End 

Date/Time                               Encounter 

Type                                    Admission 

Type                                    Attending 

Clinicians                              Care 

Facility                                Care 

Department                              Encounter 

ID                                      Source

 

                                                    2021 

08:50:59         Emergency                 [facility]    [id number] [facility]

 

                                                    2021-10-29 

14:30:09         Emergency                 [facility]    [id number] [facility]

 

                                                    2024-10-16 

15:20:00                                2024-10-16 

15:20:00  Outpatient           MARCELO HORTON    [id number] [facility]

 

                                                    2024-10-10 

07:00:00                                2024-10-10 

07:00:00            Outpatient          R                   SHEFALI ARELLANO          [facility]            [id number]      [facility]

 

                                                    2024 

00:00:00                                2024 

00:00:00  Outpatient R         RADIOLOGY [facility]      [id number] [facility]

 

                                                    2024 

13:00:00                                2024 

13:00:00            Outpatient          R                   GWEN TROY CRAIG           [facility]            [id number]      [facility]

 

                                                    2024-04-10 

08:30:00                                2024-04-10 

08:30:00            Outpatient          JESSICA CALLE           [facility]            [id number]      [facility]

 

                                                    2023 

00:00:00                                2023 

00:00:00                                Patient 

Secure [place]             [facility] 

PRIMARY 

CARE 

PAVILLION                               1.2.840.114

[id number]

[id number]

[id number]

220                       [id number]                 [facility]

 

                                                    2023 

10:30:00                                2023 

10:30:00            Outpatient          R                   NICK ALEXANDER        [facility]            [id number]      [facility]

 

                                                    2023-12-15 

10:30:00                                2023-12-15 

10:30:00            Outpatient          R                   IZZY CEDENO MARISOL      [facility]            [id number]      [facility]

 

                                                    2023 

00:00:00                                2023 

00:00:00            Outpatient          R                   FABIO BERUMEN CHERYAL         [facility]            [id number]      [facility]

 

                                                    2023 

00:00:00                                2023 

00:00:00            Outpatient          R                   FABIO BERUMEN CHERYAL         [facility]            [id number]      [facility]

 

                                                    2023 

16:30:00                                2023 

16:39:24            Outpatient          R                   LOPEZ-HAMZAH

S, IZZY

LOPEZ-HAMZAH

S, IZZY      [facility]            [id number]      [facility]

 

                                                    2023 

16:30:00                                2023 

16:39:24                                Office 

Visit                                               Lopez-Hamzah

s, Izzy                              [facility]

NAL 

BUILDING                                1..840.114

350.1.13.10

4.2.7.2.686

[id number]

134                       [id number]                 [facility]

 

                                                    2023 

00:00:00                                2023 

00:00:00                                Orders 

Only                                                Doctor 

Unassigned, 

No Name                                 [facility]                                1..840.114

350.1.13.10

4.2.7.2.686

[phone]

009                       [id number]                 [facility]

 

                                                    2023 

13:00:00                                2023 

13:00:00            Outpatient          R                   NICK ALEXANDER        [facility]            [id number]      [facility]

 

                                                    2023 

00:00:00                                2023 

00:00:00                                Patient 

Secure Msg                                          Doctor 

Unassigned, 

No Name                                 [facility]?KEN

[facility] 

MEDICAL 

OFFICE 

BUILDING                                1..840.114

350.1.13.10

4.2.7.2.686

[id number]

044                       [id number]                 [facility]

 

                                                    2023 

07:30:00                                2023 

08:21:44            Outpatient          R                   UNKNOWN, 

ATTENDING       [facility]            [id number]      [facility]

 

                                                    2023 

07:30:00                                2023 

08:21:44                                Technician 

Visit                                               Lab, Ang - 

Db

Unknown, 

Attending                               [facility] 

MEDICAL 

OFFICE 

BUILDING                                1.840.114

350.1.13.10

4.2.7.2.686

[id number]

353                       [id number]                 [facility]

 

                                                    2023 

14:30:00                                2023 

15:09:25            Outpatient          R                   SHEFALI ARELLANO          [facility]            [id number]      [facility]

 

                                                    2023 

14:30:00                                2023 

15:09:25                                Office 

Visit                                               Shefali Arellano                                [facility] 

MEDICAL 

OFFICE 

BUILDING                                1..840.114

350.1.13.10

4.2.7.2.686

[id number]

044                       [id number]                 [facility]

 

                                                    2023 

00:00:00                                2023 

00:00:00                                Patient 

Secure Msg                                          Doctor 

Unassigned, 

No Name                                 [facility] 

MEDICAL 

OFFICE 

BUILDING                                1.840.114

350.1.13.10

4.2.7.2.686

[id number]

044                       [id number]                 [facility]

 

                                                    2023 

00:00:00                                2023 

00:00:00                                Patient 

Secure Msg                                          Doctor 

Unassigned, 

No Name                                 [facility]                                1.84.114

350.1.13.10

4.2.7.2.686

[phone]

019                       [id number]                 [facility]

 

                                                    2023 

12:08:26                                2023 

23:59:00            Outpatient          R                   SHEFALI ARELLANO          [facility]            [id number]      [facility]

 

                                                    2023 

12:08:26                                2023 

23:59:00                                Hospital 

Encounter                                           Shefali Arellano                                [facility] 

MEDICAL 

OFFICE 

BUILDING                                1.840.114

350.1.13.10

4.2.7.2.686

[phone]

809                       [id number]                 [facility]

 

                                                    2023 

11:30:00                                2023 

12:03:59                                Office 

Visit                                               Shefali Arellano                                [facility] 

GERALD?KEN

[facility] 

MEDICAL 

OFFICE 

BUILDING                                1..840.114

350.1.13.10

4.2.7.2.686

[phone]

044                       [id number]                 [facility]

 

                                                    2023 

00:00:00                                2023 

00:00:00            Telephone                               Shefali Arellano                                [facility] 

GERALD?KEN

[facility] 

MEDICAL 

OFFICE 

BUILDING                                1.840.114

350.1.13.10

4.2.7.2.686

[id number]

044                       [id number]                 [facility]

 

                                                    2023-09-15 

09:30:00                                2023-09-15 

09:30:00            Outpatient          GRANT GILBERT ELISHA          [facility]            [id number]      [facility]

 

                                                    2023 

13:30:00                                2023 

14:43:09            Outpatient          [facility]            [id number]      [facility]

 

                                                    2023 

13:30:00                                2023 

14:43:09                                Office 

Visit                                               Grant Rodney                                  [facility] 

PROFESSIO

NAL 

BUILDING                                1.840.114

350.1.13.10

4.2.7.2.686

[id number]

098                       [id number]                 [facility]

 

                                                    2023 

00:00:00                                2023 

00:00:00                                Orders 

Only                                                Doctor 

Unassigned, 

No Name                                 [facility]                                1.840.114

350.1.13.10

4.2.7.2.686

[phone]

009                       [id number]                 [facility]

 

                                                    2023 

09:50:00                                2023 

23:59:00            Outpatient          FILIBERTO HAJI           [facility]            [id number]      [facility]

 

                                                    2023 

09:00:00                                2023 

10:33:55                                Office 

Visit                                               Filiberto Matthew                                 [facility] 

GERALD?[facility] 

MEDICAL 

OFFICE 

BUILDING                                1..840.114

350.1.13.10

4.2.7.2.686

[phone]

198                       [id number]                 [facility]

 

                                                    2023-07-10 

13:30:00                                2023-07-10 

13:30:00            Outpatient          R                   GRANT RODNEY ELISHA          [facility]            [id number]      [facility]

 

                                                    2023 

13:00:00                                2023 

13:00:00            Outpatient          R                   FABIO BERUMEN CHERYAL         [facility]            [id number]      [facility]

 

                                                    2023 

13:41:00                                2023 

17:02:00            Emergency           X                   LANA SHARPE         [facility]            ERT             [id number]      [facility]

 

                                                    2023 

13:41:00                                2023 

17:02:00            Emergency                               Lana Sharpe E                               [facility]                                  1.2.840.114

350.1.13.10

4.2.7.2.686

[id number]

084                       [id number]                 [facility]

 

                                                    2023 

14:30:00                                2023 

14:30:00            Outpatient          R                   GWEN TROY           [facility]            [id number]      [facility]

 

                                                    2023 

00:00:00                                2023 

00:00:00                                Orders 

Only                                                Doctor 

Unassigned, 

No Name                                 [facility]                                1.2.840.114

350.1.13.10

4.2.7.2.686

[id number]

009                       [id number]                 [facility]

 

                                                    2023-03-10 

00:00:00                                2023-03-10 

00:00:00            Outpatient          R                   FLORIDA ANDERS YU         [facility]            [id number]      [facility]

 

                                                    2023 

14:00:00                                2023 

14:20:55            Outpatient          R                   FLORIDA ANDERS YU         [facility]            [id number]      [facility]

 

                                                    2023 

14:00:00                                2023 

14:20:55                                Office 

Visit                                   [place] Anders             [facility] 

PRIMARY 

CARE 

PAVILLION                               1.114

350.1.13.10

4.2.7.2.686

[id number]

220                       [id number]                 [facility]

 

                                                    2023 

14:00:00                                2023 

14:48:04                                Technician 

Visit                                               Lab, Shefali Mckeon                                [facility] 

MEDICAL 

OFFICE 

BUILDING                                1.114

350.1.13.10

4.2.7.2.686

[phone]

353                       [id number]                 [facility]

 

                                                    2023 

14:00:00                                2023 

14:00:00            Outpatient          R                   SHEFALI ARELLANO          [facility]            [id number]      [facility]

 

                                                    2023 

00:00:00                                2023 

00:00:00                                Patient 

Secure Msg                                          Doctor 

Unassigned, 

No Name                                 [facility]                                1.114

350.1.13.10

4.2.7.2.686

[phone]

019                       [id number]                 [facility]

 

                                                    2023 

16:30:00                                2023 

17:27:57            Outpatient          R                   SHFEALI ARELLANO          [facility]            [id number]      [facility]

 

                                                    2023 

16:30:00                                2023 

17:27:57                                Office 

Visit                                               [facility] 

MEDICAL 

OFFICE 

BUILDING                                1.84114

350.1.13.10

4.2.7.2.686

[id number]

044                       [id number]                 [facility]

 

                                                    2023-02-15 

00:00:00                                2023-02-15 

00:00:00            Telephone                               Fabio Berumen                                 [facility] 

[facility]                                  1.114

350.1.13.10

4.2.7.2.686

[id number]

134                       [id number]                 [facility]

 

                                                    2023-02-10 

00:00:00                                2023-02-10 

00:00:00                                Patient 

Secure MsShefali Giraldo                                [facility] 

MEDICAL 

OFFICE 

BUILDING                                1.840.114

350.1.13.10

4.2.7.2.686

[id number]

044                       [id number]                 [facility]

 

                                                    2023 

00:00:00                                2023 

00:00:00            Telephone                               Fabio Berumen                                 [facility] 

[facility] 

CLINIC                                  1.2840.114

350.1.13.10

4.2.7.2.686

[phone]

134                       [id number]                 [facility]

 

                                                    2023 

13:45:00                                2023 

14:00:00                                Technician 

Visit                                               Kandace, Adc 

Lab Main

Fabio Berumen                                 [facility]

[facility] 

[facility]                                1.840.114

350.1.13.10

4.2.7.2.686

[phone]

353                       [id number]                 [facility]

 

                                                    2023 

13:45:00                                2023 

13:45:00            Outpatient          R                   [facility]ABIO BERUMNE

[facility]         [facility]            [id number]      [facility]

 

                                                    2023 

00:00:00                                2023 

00:00:00                                Orders 

Only                                                Doctor 

Unassigned, 

No Name                                 [facility]                                1.2840.114

350.1.13.10

4.2.7.2.686

[phone]

009                       [id number]                 [facility]

 

                                                    2022 

00:00:00                                2022 

00:00:00            Telephone                               Rosalinda 

[facility]                                 [facility] 

PEDIATRIC 

CLINIC                                  1.840.114

350.1.13.10

4.2.7.2.686

[phone]

134                       [id number]                  [facility]

 

                                                    2022 

00:00:00                                2022 

00:00:00            Telephone                               Rosalinda 

[facility]                                 [facility] 

WOMENS 

[facility] 

CLINIC                                  1.2840.114

350.1.13.10

4.2.7.2.686

[phone]

134                       [id number]                  [facility]

 

                                                    2022 

10:45:00                                2022 

10:45:00            Outpatient          R                   FABIO BERUMEN CHERYAL         [facility]            [id number]      [facility]

 

                                                    2022 

11:30:00                                2022 

12:02:45            Outpatient          R                   FABIO BERUMEN CHERYAL         [facility]            [id number]      [facility]

 

                                                    2022 

11:30:00                                2022 

12:02:45                                Office 

Visit                                               Fabio Berumen                                 [facility]                                  1.2.840.114

350.1.13.10

4.2.7.2.686

[phone]

134                       [id number]                  [facility]

 

                                                    2022 

00:00:00                                2022 

00:00:00                                Orders 

Only                                                Doctor 

Unassigned, 

No Name                                 [facility]                                1.2.840.114

350.1.13.10

4.2.7.2.686

[phone]

009                       [id number]                  [facility]

 

                                                    2022 

00:00:00                                2022 

00:00:00                                Letter 

(Out)                                               Fabio Berumen                                 [facility]                                  1.2.840.114

350.1.13.10

4.2.7.2.686

[phone]

134                       [id number]                  [facility]

 

                                                    2022 

15:45:00                                2022 

15:45:00            Outpatient          R                   KESHIA GALEANA           [facility]            [id number]      [facility]

 

                                                    2022 

15:45:00                                2022 

15:45:00            Outpatient          R                   KESHIA GALEANA           [facility]            [id number]      [facility]

 

                                                    2022 

18:49:00                                2022 

20:14:00            Emergency           X                   BRO RICHARD     [facility]            ERT             [id number]      [facility]

 

                                                    2022 

18:49:00                                2022 

20:14:00            Emergency                               HartfieldBro gordon                             [facility]                                  1..114

350.1.13.10

4.2.7.2.686

[phone]

084                       [id number]                  [facility]

 

                                                    2022 

18:15:00                                2022 

18:35:00                                Nurse 

Visit                                               Nurse, [facility] 

[facility]

Juanis Zarate                                   [facility]?LUZCINDY

[facility] 

MEDICAL 

OFFICE 

BUILDING                                1..114

350.1.13.10

4.2.7.2.686

[phone]

370                       [id number]                  [facility]

 

                                                    2022 

18:15:00                                2022 

18:15:00            Outpatient          JUANIS ARVIZU           [facility]            [id number]      [facility]

 

                                                    2022 

00:00:00                                2022 

00:00:00                                Orders 

Only                                                Doctor 

Unassigned, 

No Name                                 [facility]                                1.114

350.1.13.10

4.2.7.2.686

[phone]

009                       [id number]                  [facility]

 

                                                    2022 

00:00:00                                2022 

00:00:00            Norma Mills                                [facility]                               1..114

350.1.13.10

4.2.7.2.686

[phone]

144                       [id number]                  [facility]

 

                                                    2022 

09:00:00                                2022 

09:00:00            Outpatient          R                   FLORIDA ANDERS YU         [facility]            [id number]      [facility]

 

                                                    2022 

00:00:00                                2022 

00:00:00                                Patient 

Secure Msg                                          Shefali Arellano                                [facility] 

MEDICAL 

OFFICE 

BUILDING                                1.84.114

350.1.13.10

4.2.7.2.686

[phone]

044                       [id number]                  [facility]

 

                                                    2021 

00:00:00                                2021 

00:00:00                                Patient 

Secure Msg                                          Doctor 

Unassigned, 

No Name                                 [facility]                                1.2840.114

350.1.13.10

4.2.7.2.686

[phone]

019                       [id number]                  [facility]

 

                                                    2021 

00:00:00                                2021 

00:00:00                                Patient 

Secure Msg                                          Doctor 

Unassigned, 

No Name                                 [facility]                                1.2840.114

350.1.13.10

4.2.7.2.686

[phone]

019                       [id number]                  [facility]

 

                                                    2021 

15:34:49                                2021 

16:43:55                                Office 

Visit                                               [facility]aleana                                   [facility]

[facility] 

[facility]                                1..840.114

350.1.13.10

4.2.7.2.686

[phone]

134                       [id number]                  [facility]

 

                                                    2021 

15:30:00                                2021 

16:43:55            Outpatient          KESHIA VEE           [facility]            [id number]      [facility]

 

                                                    2021 

15:30:00                                2021 

15:30:00            Outpatient          KESHIA VEE           [facility]            [id number]      [facility]

 

                                                    2021 

00:00:00                                2021 

00:00:00            Telephone                               Shefali Arellano                                [facility] OLIVA 

MEDICAL 

OFFICE 

BUILDING                                1.2.840.114

350.1.13.10

4.2.7.2.686

[phone]

044                       [id number]                  [facility]

 

                                                    2021 

10:30:00                                2021 

10:30:00            Outpatient          SHEFALI BENAVIDES          [facility]            [id number]      [facility]

 

                                                    2021 

09:14:32                                2021 

09:29:32                                Technician 

Visit                                               Pob, Adc 

Lab Main

She[facility]ali Arellano                                [facility]

[facility] 

BUILD[facility]                                1.2.840.114

350.1.13.10

4.2.7.2.686

[phone]

353                       [id number]                  [facility]

 

                                                    2021 

07:37:00                                2021 

09:12:00            Emergency           X                   EN ABRAMS         [facility]             [id number]      [facility]

 

                                                    2021 

07:37:00                                2021 

09:12:00            Emergency                               En Abrams                                 [facility]                                  1.2.840.114

350.1.13.10

4.2.7.2.686

[phone]

084                       [id number]                  [facility]

 

                                                    2021 

00:00:00                                2021 

00:00:00                                Letter 

(Out)                                               Shefali Arellano                                [facility] 

MEDICAL 

OFFICE 

BUILDING                                1.2.840.114

350.1.13.10

4.2.7.2.686

[phone]

044                       [id number]                  [facility]

 

                                                    2021-10-30 

00:00:00                                2021-10-30 

00:00:00                                Patient 

Secure Msg                                          Doctor 

Unassigned, 

No Name                                 [facility]                                1.2.840.114

350.1.13.10

4.2.7.2.686

[phone]

019                       [id number]                  [facility]

 

                                                    2021-10-27 

14:29:10                                2021-10-27 

15:24:57                                Office 

Visit                                               Shefali Arellano                                [facility] 

Gerald?[facility] 

Medical 

Office 

Building                                1.2.840.114

350.1.13.10

4.2.7.2.686

[phone]

044                       [id number]                  [facility]

 

                                                    2021-10-27 

14:30:00                                2021-10-27 

14:30:00            Outpatient          R                   SHEFALI ARELLANO          [facility]            [id number]      [facility]

 

                                                    2021-10-27 

00:00:00                                2021-10-27 

00:00:00                                Orders 

Only                                                Doctor 

Unassigned, 

No Name                                 [facility]                                1.2840.114

350.1.13.10

4.2.7.2.686

[phone]

009                       [id number]                  [facility]

 

                                                    2021-10-26 

00:00:00                                2021-10-26 

00:00:00                                Letter 

(Out)                                               Nurse, Isaias Fernandez                                      [facility] 

Gerald?Ken

alberto 

Medical 

Office 

Building                                1.2840.114

350.1.13.10

4.2.7.2.686

[phone]

044                       [id number]                  [facility]

 

                                                    2021-10-04 

00:00:00                                2021-10-04 

00:00:00  Outpatient R         RADIOLOGY [facility]      [id number] [facility]

 

                                                    2021 

00:00:00                                2021 

00:00:00            Refill                                  Shefali Arellano                                [facility] 

Office 

Building 

One                                     1..114

350.1.13.10

4.2.7.2.686

[phone]

044                       [id number]                  [facility]

 

                                                    2021 

00:00:00                                2021 

00:00:00                                Patient 

Secure Keshia Fitzpatrick                                   [facility] 

BUILDING                                1.840.114

350.1.13.10

4.2.7.2.686

[phone]

134                       [id number]                  [facility]

 

                                                    2021 

08:05:00                                2021 

09:57:00            Emergency                               En Abrams                                 [facility]                                  1.2840.114

350.1.13.10

4.2.7.2.686

[phone]

084                       [id number]                  [facility]

 

                                                    2021 

00:00:00                                2021 

00:00:00                                Orders 

Only                                                Doctor 

Unassigned, 

No Name                                 [facility]                                1.20.114

350.1.13.10

4.2.7.2.686

[phone]

009                       [id number]                  [facility]

 

                                                    2021-04-10 

20:00:00                                2021-04-10 

20:00:00  Outpatient                     [facility]      [id number] [facility]

 

                                                    2021 

19:15:00                                2021 

19:15:00            Outpatient          TEJA OKEEFEUMBLANCA             [facility]            [id number]      [facility]

 

                                                    2021 

00:00:00                                2021 

00:00:00                                Patient 

Outreach                                            [place] 

Elioerik Wen                                  [facility] 

PRIMARY 

CARE 

PAVILLION                               1.0.114

350.1.13.10

4.2.7.2.686

[phone]

388                       [id number]                  [facility]

 

                                                    2021 

00:00:00                                2021 

00:00:00            Refill                                  Norma Edge                                [facility] 

[facility]                               1.0.114

350.1.13.10

4.2.7.2.686

[phone]

144                       [id number]                  [facility]

 

                                                    2021 

00:00:00                                2021 

00:00:00                                Patient 

Secure Msg                                          Doctor 

Unassigned, 

No Name                                 [facility] 

OFFICE 

BUILDING 

ONE                                     .0.114

350.1.13.10

4.2.7.2.686

[phone]

044                       [id number]                  [facility]

 

                                                    2021 

00:00:00                                2021 

00:00:00                                Patient 

Secure Msg                                          Doctor 

Unassigned, 

No Name                                 [facility]                                1.840.114

350.1.13.10

4.2.7.2.686

[phone]

019                       [id number]                  [facility]

 

                                                    2021 

13:08:06                                2021 

13:28:06                                Urgent 

Care                                                Provider, 

[facility] 

[facility]

Shekhar Herbert                                 [facility] 

Office 

Building 

One                                     ..114

350.1.13.10

4.2.7.2.686

[phone]

044                       [id number]                  [facility]

 

                                                    2021 

13:20:00                                2021 

13:20:00            Outpatient          R                   SHEKHAR HERBERT         [facility]            [id number]      [facility]

 

                                                    2021 

09:45:00                                2021 

09:45:00            [facility]          [facility]                   [facility]            [id number]      [facility]

 

                                                    2021 

00:00:00                                2021 

00:00:00            Outpatient          R                   EDGE, 

[facility]            [id number]      [facility]

 

                                                    2021 

00:00:00                                2021 

00:00:00            Outpatient          R                   EDGE, 

[facility]            [id number]      [facility]

 

                                                    2020 

00:00:00                                2020 

00:00:00  Outpatient R         RADIOLOGY [facility]      [id number] [facility]

 

                                                    2020 

10:00:00                                2020 

10:00:00            Outpatient          R                   GLORIA 

ASIA           [facility]            [id number]      [facility]

 

                                                    2020 

09:28:14                                2020 

09:43:14                                Office 

Visit                                               EdgeNorma GEORGIA                                [facility]                               .840.114

350.1.13.10

4.2.7.2.686

[phone]

144                       [id number]                  [facility]

 

                                                    2020 

09:15:00                                2020 

09:15:00            Outpatient          R                   EDGE, 

[facility]            [id number]      [facility]

 

                                                    2020 

00:00:00                                2020 

00:00:00                                Patient 

Secure Msg                                          Doctor 

Unassigned, 

No Name                                 [facility] 

OFFICE 

BUILDING 

ONE                                     ..840.114

350.1.13.10

4.2.7.2.686

[phone]

044                       [id number]                  [facility]

 

                                                    2020 

00:00:00                                2020 

00:00:00            Refill                                  Jessica Stewart 

[facility] 

Office 

Building 

One                                     1.84.114

350.1.13.10

4.2.7.2.686

[phone]

044                       [id number]                  [facility]

 

                                                    2020 

00:00:00                                2020 

00:00:00            Refill                                  Jessica Stewart 

[facility] 

Office 

Building 

One                                     1..114

350.1.13.10

4.2.7.2.686

[phone]

044                       [id number]                  [facility]

 

                                                    2020 

00:00:00                                2020 

00:00:00            Refill                                  Jessica Stewart 

[facility] 

Office 

Building 

One                                     1.84.114

350.1.13.10

4.2.7.2.686

[phone]

044                       [id number]                  

 

                                                    2020 

00:00:00                                2020 

00:00:00            Refill                                  ZenaidaJessica kraus 

Edaramis                                  [facility] 

Building                                1.84.114

350.1.13.10

4.2.7.2.686

[phone]

044                       [id number]                  [facility]

 

                                                    2020 

00:00:00                                2020 

00:00:00            Refill                                  Doctor 

Unassigned, 

No Name                                 [facility] 

Building                                1.84.114

350.1.13.10

4.2.7.2.686

[phone]

044                       [id number]                  [facility]

 

                                                    2020 

00:00:00                                2020 

00:00:00            Refill                                  Shefali Arellano                                [facility] 

Building                                1.840.114

350.1.13.10

4.2.7.2.686

[phone]

044                       [id number]                  [facility]

 

                                                    2020 

00:00:00                                2020 

00:00:00            Refill                                  Doctor 

Unassigned, 

No Name                                 [facility] 

Office 

Building 

One                                     1.840.114

350.1.13.10

4.2.7.2.686

[phone]

044                       [id number]                  [facility]

 

                                                    2020 

00:00:00                                2020 

00:00:00            Refill                                  Jessica Stewart                                  [facility] 

Building                                1.20.114

350.1.13.10

4.2.7.2.686

[phone]

044                       [id number]                  

 

                                                    2020 

00:00:00                                2020 

00:00:00            Refill                                  Doctor 

Unassigned, 

No Name                                 [facility] 

Building                                1..114

350.1.13.10

4.2.7.2.686

[phone]

044                       [id number]                  

 

                                                    2020 

00:00:00                                2020 

00:00:00  Outpatient R         RADIOLOGY [facility]      [id number] [facility]

 

                                                    2020 

08:45:00                                2020 

08:45:00            Outpatient          R                   DARREN MAJOR        [facility]            [id number]      [facility]

 

                                                    2020 

00:00:00                                2020 

00:00:00                                Patient 

Secure Msg                                          Doctor 

Unassigned, 

No Name                                 [facility]                                1..114

350.1.13.10

4.2.7.2.686

[phone]

019                       [id number]                  [facility]

 

                                                    2020 

00:00:00                                2020 

00:00:00                                Letter 

(Out)                                               Tiffany 

[facility]                                1..114

350.1.13.10

4.2.7.2.686

[phone]

019                       [id number]                  [facility]

 

                                                    2020 

00:00:00                                2020 

00:00:00                                Letter 

(Out)                                               Tiffany 

[facility]                                1.20.114

350.1.13.10

4.2.7.2.686

[phone]

019                       [id number]                  

 

                                                    2020 

20:10:00                                2020 

20:59:00            Emergency                               Nella Price                                 [facility]                                  1.2.840.114

350.1.13.10

4.2.7.2.686

[phone]

084                       [id number]                  [facility]

 

                                                    2020 

20:10:00                                2020 

20:59:00            Emergency                               Nella Price                                 [facility]                                  1.2.840.114

350.1.13.10

4.2.7.2.686

[phone]

084                       [id number]                  

 

                                                    2020 

00:00:00                                2020 

00:00:00                                Patient 

Secure Msg                                          Doctor 

Unassigned, 

No Name                                 [facility] 

Office 

Building 

One                                     1.2840.114

350.1.13.10

4.2.7.2.686

[phone]

044                       [id number]                  [facility]

 

                                                    2020 

00:00:00                                2020 

00:00:00                                Patient 

Secure Msg                                          Doctor 

Unassigned, 

No Name                                 [facility] 

Office 

Building 

One                                     1.2840.114

350.1.13.10

4.2.7.2.686

[phone]

044                       [id number]                  

 

                                                    2020 

11:29:55                                2020 

12:03:59                                Urgent 

Care                                                Provider, 

[facility]

Shekhar Herbert                                 [facility] 

Office 

Building 

One                                     1.2840.114

350.1.13.10

4.2.7.2.686

[phone]

044                       [id number]                  [facility]

 

                                                    2020 

11:29:55                                2020 

12:03:59                                Urgent 

Care                                                Provider, 

[facility]                                    [facility] 

Office 

Building 

One                                     1.2840.114

350.1.13.10

4.2.7.2.686

[phone]

044                       [id number]                  

 

                                                    2020 

11:40:00                                2020 

11:40:00  Outpatient R                   [facility]      [id number] [facility]

 

                                                    2020 

00:00:00                                2020 

00:00:00                                Patient 

Secure Msg                                          Doctor 

Unassigned, 

No Name                                 [facility] 

OFFICE 

BUILDING 

ONE                                     1.2840.114

350.1.13.10

4.2.7.2.686

[phone]

044                       [id number]                  [facility]

 

                                                    2020 

08:00:00                                2020 

08:00:00            Outpatient          R                   LISSETT 

KESHIA           [facility]            [id number]      [facility]

 

                                                    2020 

00:00:00                                2020 

00:00:00            Telephone                               Shefali Arellano CINDY                                [facility] 

Building                                1.840.114

350.1.13.10

4.2.7.2.686

[phone]

044                       [id number]                  [facility]

 

                                                    2020 

00:00:00                                2020 

00:00:00            Telephone                               Rose Arellanoangus WHITE                                [facility] 

Building                                1..114

350.1.13.10

4.2.7.2.686

[phone]

044                       [id number]                  

 

                                                    2020 

10:30:00                                2020 

10:30:00            Outpatient          R                   JESSICA STEWART           [facility]            [id number]      [facility]

 

                                                    2020 

00:00:00                                2020 

00:00:00                                Patient 

Secure Msg                                          Doctor 

Unassigned, 

No Name                                 [facility]                                1..114

350.1.13.10

4.2.7.2.686

[phone]

019                       [id number]                  [facility]

 

                                                    2020 

00:00:00                                2020 

00:00:00                                Patient 

Secure Msg                                          Doctor 

Unassigned, 

No Name                                 [facility]                                1.0.114

350.1.13.10

4.2.7.2.686

[phone]

019                       [id number]                  

 

                                                    2020 

16:43:41                                2020 

13:56:02                                Office 

Visit                                               Shekhar Herbert                                 [facility] 

Office 

Building 

One                                     1.2.840.114

350.1.13.10

4.2.7.2.686

[phone]

044                       [id number]                  [facility]

 

                                                    2020 

16:43:41                                2020 

17:03:41                                Office 

Visit                                               Shekhar Herbert                                 [facility] 

Office 

Building 

One                                     1.2.840.114

350.1.13.10

4.2.7.2.686

[phone]

044                       [id number]                  

 

                                                    2019 

00:00:00                                2019 

00:00:00            Refill                                  Rose Arellanoe CINDY                                [facility] 

Office 

Building 

One                                     1.2.840.114

350.1.13.10

4.2.7.2.686

[phone]

044                       [id number]                  [facility]

 

                                                    2019-09-10 

00:00:00                                2019-09-10 

00:00:00            Telephone                               Adrien Mercer                             [facility]                                1.2.840.114

350.1.13.10

4.2.7.2.686

[phone]

059                       [id number]                  [facility]

 

                                                    2019 

08:27:47                                2019 

23:59:00                                Hospital 

Encounter                                           Adrien Mercer                             [facility]                                  1.2.840.114

350.1.13.10

4.2.7.2.686

[phone]

805                       [id number]                  [facility]

 

                                                    2019 

08:27:25                                2019 

23:59:00                                Hospital 

Encounter                                           Adrien Mercer                             [facility]                                  1.2.840.114

350.1.13.10

4.2.7.2.686

[phone]

805                       [id number]                  [facility]

 

                                                    2019 

08:27:02                                2019 

23:59:00                                Hospital 

Encounter                                           Adrien MercerSinaLOLISSina                             [facility]                                  1.2.840.114

350.1.13.10

4.2.7.2.686

[phone]

805                       [id number]                  [facility]

 

                                                    2019 

08:26:36                                2019 

08:26:36                                Hospital 

Encounter                                           Adrien Mercer

[employer] 

Cardio Echo                             [facility]                                  1.2.840.114

350.1.13.10

4.2.7.2.686

[phone]

032                       [id number]                  [facility]

 

                                                    2019 

08:26:10                                2019 

08:26:10                                Hospital 

Encounter                                           Adrien MercerSinaLOLISSina                             [facility]                                  1.2.840.114

350.1.13.10

4.2.7.2.686

[phone]

805                       [id number]                  [facility]

 

                                                    2019 

00:00:00                                2019 

00:00:00                                Orders 

Only                                                Doctor 

Unassigned, 

No Name                                 [facility]                                1.2.840.114

350.1.13.10

4.2.7.2.686

[phone]

009                       [id number]                  [facility]

 

                                                    2019 

00:00:00                                2019 

00:00:00            Telephone                               Shefali Arellano                                [facility] 

Office 

Building 

One                                     1.0.114

350.1.13.10

4.2.7.2.686

[phone]

044                       [phone]                  [facility]

 

                                                    2019 

00:00:00                                2019 

00:00:00            Refill                                  [facility] Arellano                                [facility] 

[facility] 

Building 

One                                     1.2840.114

350.1.13.10

4.2.7.2.686

[phone]

044                       [id number]                  [facility]

 

                                                    2019 

00:00:00                                2019 

00:00:00            Refill                                  Shefali Arellano                                [facility] 

Office 

Building 

One                                     1..840.114

350.1.13.10

4.2.7.2.686

[phone]

044                       [id number]                  [facility]

 

                                                    2019 

00:00:00                                2019 

00:00:00                                Orders 

Only                                                Doctor 

Unassigned, 

No Name                                 [facility]                                1.2.840.114

350.1.13.10

4.2.7.2.686

[phone]

009                       [id number]                  [facility]

 

                                                    2019 

00:00:00                                2019 

00:00:00            Telephone                               Adrien Mercer                             [facility] 

Building                                1..840.114

350.1.13.10

4.2.7.2.686

[phone]

059                       [id number]                  [facility]







Results





                    Test Description Test Time Test Comments Results   Result Co

mments Source







                                        

 

                                        

 

                                        

 

                                        

 

                                        

 

                                        

 

                                        

 

                                        

 

                                        





[facility] [id number]
 19:27:00* 



                      Test Item  Value      Reference Range Interpretation Comme

nts

 

                      POCT PH U (test code = 3254) 6 mg/dl    5-8               

    

 

                                                    POCT U LEUK EST (test code =

 

3263)           Negative        Negative - Negative                 

 

                      POCT U NIT (test code = 3262) Negative   Negative - Negati

ve            

 

                      POCT U PROT (test code = 3259) Negative   Negative - Negat

judit            

 

                      POCT U GLU (test code = 3256) Negative   Negative - Negati

ve            

 

                      POCT U KETONE (test code = 3258) Negative   Negative - Neg

ative            

 

                      POCT U BLD (test code = 3257) Negative   Negative - Negati

ve            





[facility] SPECIFIC [id number]
 19:27:00* 



                      Test Item  Value      Reference Range Interpretation Comme

nts

 

                      POCT PH U (test code = 3254) 6 mg/dl    5-8               

    

 

                                                    POCT U LEUK EST (test code =

 

3263)           Negative        Negative - Negative                 

 

                      POCT U NIT (test code = 3262) Negative   Negative - Negati

ve            

 

                      POCT U PROT (test code = 3259) Negative   Negative - Negat

judit            

 

                      POCT U GLU (test code = 3256) Negative   Negative - Negati

ve            

 

                      POCT U KETONE (test code = 3258) Negative   Negative - Neg

ative            

 

                      POCT U BLD (test code = 3257) Negative   Negative - Negati

ve            





[facility] [id number] 05:44:08
  * 



                      Test Item  Value      Reference Range Interpretation Comme

nts

 

                                                    TSH (test code = 

8272518738)     1.04            See_Comment                     [Automated messa

ge] 

The system which 

generated this result 

transmitted reference 

range: 0.45 - 4.70 

mIU/L. The reference 

range was not used to 

interpret this result 

as normal/abnormal.

 

                                                    Lab Interpretation (test 

code = 97572-8) Normal                                          





[facility] [id number] 05:44:08
  * 



                      Test Item  Value      Reference Range Interpretation Comme

nts

 

                                                    TSH (test code = 

5933101941)     1.04            See_Comment                     [Automated messa

ge] 

The system which 

generated this result 

transmitted reference 

range: 0.45 - 4.70 

mIU/L. The reference 

range was not used to 

interpret this result 

as normal/abnormal.

 

                                                    Lab Interpretation (test 

code = 88715-8) Normal                                          





[facility] [id number] 05:30:30* 



                      Test Item  Value      Reference Range Interpretation Comme

nts

 

                                                    FREE T4 (test code = 

3354463850)     1.19            See_Comment                     [Automated messa

ge] 

The system which 

generated this result 

transmitted reference 

range: 0.78 - 2.20 

ng/dL:. The reference 

range was not used to 

interpret this result 

as normal/abnormal.

 

                                                    Lab Interpretation (test 

code = 55668-1) Normal                                          





[facility] [id number] 05:30:30* 



                      Test Item  Value      Reference Range Interpretation Comme

nts

 

                                                    FREE T4 (test code = 

3351336161)     1.19            See_Comment                     [Automated messa

ge] 

The system which 

generated this result 

transmitted reference 

range: 0.78 - 2.20 

ng/dL:. The reference 

range was not used to 

interpret this result 

as normal/abnormal.

 

                                                    Lab Interpretation (test 

code = 06352-1) Normal                                          





[facility] [id number] 05:30:09* 



                      Test Item  Value      Reference Range Interpretation Comme

nts

 

                      FREE T3 (test code = 2543160966) 3.96 pg/mL 2.77-5.27     

        

 

                                                    Lab Interpretation (test cod

e = 

32440-5)        Normal                                          





[id number] 05:30:09* 



                      Test Item  Value      Reference Range Interpretation Comme

nts

 

                      FREE T3 (test code = 0461310707) 3.96 pg/mL 2.77-5.27     

        

 

                                                    Lab Interpretation (test cod

e = 

34051-2)        Normal                                          





[facility] WITH [id number] 21:51:29* 



                      Test Item  Value      Reference Range Interpretation Comme

nts

 

                                                    WBC (test code = 

6690-2)         6.05            See_Comment                     [Automated messa

ge] 

The system which 

generated this 

result transmitted 

reference range: 

4.30 - 11.10 

10*3/?L. The 

reference range was 

not used to 

interpret this 

result as 

normal/abnormal.

 

                                                    RBC (test code = 

789-8)          4.68            See_Comment                     [Automated messa

ge] 

The system which 

generated this 

result transmitted 

reference range: 

3.93 - 5.25 

10*6/?L. The 

reference range was 

not used to 

interpret this 

result as 

normal/abnormal.

 

                                                    HGB (test code = 

718-7)          13.8 g/dL       11.6-15.0                       

 

                                                    HCT (test code = 

4544-3)         42.6 %          35.7-45.2                       

 

                                                    MCV (test code = 

787-2)          91.0 fL         80.6-95.5                       

 

                                                    MCH (test code = 

785-6)          29.5 pg         25.9-32.8                       

 

                                                    MCHC (test code = 

786-4)          32.4 g/dL       31.6-35.1                       

 

                                                    RDW-SD (test code = 

35627-4)        43.0 fL         39.0-49.9                       

 

                                                    RDW-CV (test code = 

788-0)          12.8 %          12.0-15.5                       

 

                                                    PLT (test code = 

777-3)          299             See_Comment                     [Automated messa

ge] 

The system which 

generated this 

result transmitted 

reference range: 

166 - 358 10*3/?L. 

The reference range 

was not used to 

interpret this 

result as 

normal/abnormal.

 

                                                    MPV (test code = 

27134-6)        9.7 fL          9.5-12.9                        

 

                                                    NRBC/100 WBC (test 

code = 0980680410) 0.0             See_Comment                     [Automated me

ssage] 

The system which 

generated this 

result transmitted 

reference range: 

0.0 - 10.0 /100 

WBCs. The reference 

range was not used 

to interpret this 

result as 

normal/abnormal.

 

                                                    NRBC x10^3 (test code 

= 3132406214)                   See_Comment                     [Automated messa

ge] 

The system which 

generated this 

result transmitted 

reference range: 

10*3/?L. The 

reference range was 

not used to 

interpret this 

result as 

normal/abnormal.

 

                                                    GRAN MAT (NEUT) % 

(test code = 770-8) 47.5 %                                          

 

                                                    IMM GRAN % (test code 

= 9827006410)   0.00 %                                          

 

                                                    LYMPH % (test code = 

736-9)          35.0 %                                          

 

                                                    MONO % (test code = 

5905-5)         7.9 %                                           

 

                                                    EOS % (test code = 

713-8)          8.8 %                                           

 

                                                    BASO % (test code = 

706-2)          0.8 %                                           

 

                                                    GRAN MAT x10^3(ANC) 

(test code = 

7430652382)     2.87 10*3/uL    1.88-7.09                       

 

                                                    IMM GRAN x10^3 (test 

code = 3420563977)                 0.00-0.06                       

 

                                                    LYMPH x10^3 (test code 

= 731-0)        2.12 10*3/uL    1.32-3.29                       

 

                                                    MONO x10^3 (test code 

= 742-7)        0.48 10*3/uL    0.33-0.92                       

 

                                                    EOS x10^3 (test code = 

711-2)          0.53 10*3/uL    0.03-0.39       H               

 

                                                    BASO x10^3 (test code 

= 704-7)        0.05 10*3/uL    0.01-0.07                       

 

                                                    Lab Interpretation 

(test code = 55360-4) Abnormal                                        





[facility] WITH [id number] 21:51:29* 



                      Test Item  Value      Reference Range Interpretation Comme

nts

 

                                                    WBC (test code = 

6690-2)         6.05            See_Comment                     [Automated messa

ge] 

The system which 

generated this 

result transmitted 

reference range: 

4.30 - 11.10 

10*3/?L. The 

reference range was 

not used to 

interpret this 

result as 

normal/abnormal.

 

                                                    RBC (test code = 

789-8)          4.68            See_Comment                     [Automated messa

ge] 

The system which 

generated this 

result transmitted 

reference range: 

3.93 - 5.25 

10*6/?L. The 

reference range was 

not used to 

interpret this 

result as 

normal/abnormal.

 

                                                    HGB (test code = 

718-7)          13.8 g/dL       11.6-15.0                       

 

                                                    HCT (test code = 

4544-3)         42.6 %          35.7-45.2                       

 

                                                    MCV (test code = 

787-2)          91.0 fL         80.6-95.5                       

 

                                                    MCH (test code = 

785-6)          29.5 pg         25.9-32.8                       

 

                                                    MCHC (test code = 

786-4)          32.4 g/dL       31.6-35.1                       

 

                                                    RDW-SD (test code = 

16852-7)        43.0 fL         39.0-49.9                       

 

                                                    RDW-CV (test code = 

788-0)          12.8 %          12.0-15.5                       

 

                                                    PLT (test code = 

777-3)          299             See_Comment                     [Automated messa

ge] 

The system which 

generated this 

result transmitted 

reference range: 

166 - 358 10*3/?L. 

The reference range 

was not used to 

interpret this 

result as 

normal/abnormal.

 

                                                    MPV (test code = 

70038-4)        9.7 fL          9.5-12.9                        

 

                                                    NRBC/100 WBC (test 

code = 3181374929) 0.0             See_Comment                     [Automated me

ssage] 

The system which 

generated this 

result transmitted 

reference range: 

0.0 - 10.0 /100 

WBCs. The reference 

range was not used 

to interpret this 

result as 

normal/abnormal.

 

                                                    NRBC x10^3 (test code 

= 7630905110)                   See_Comment                     [Automated messa

ge] 

The system which 

generated this 

result transmitted 

reference range: 

10*3/?L. The 

reference range was 

not used to 

interpret this 

result as 

normal/abnormal.

 

                                                    GRAN MAT (NEUT) % 

(test code = 770-8) 47.5 %                                          

 

                                                    IMM GRAN % (test code 

= 5921967914)   0.00 %                                          

 

                                                    LYMPH % (test code = 

736-9)          35.0 %                                          

 

                                                    MONO % (test code = 

5905-5)         7.9 %                                           

 

                                                    EOS % (test code = 

713-8)          8.8 %                                           

 

                                                    BASO % (test code = 

706-2)          0.8 %                                           

 

                                                    GRAN MAT x10^3(ANC) 

(test code = 

6332945689)     2.87 10*3/uL    1.88-7.09                       

 

                                                    IMM GRAN x10^3 (test 

code = 0594687895)                 0.00-0.06                       

 

                                                    LYMPH x10^3 (test code 

= 731-0)        2.12 10*3/uL    1.32-3.29                       

 

                                                    MONO x10^3 (test code 

= 742-7)        0.48 10*3/uL    0.33-0.92                       

 

                                                    EOS x10^3 (test code = 

711-2)          0.53 10*3/uL    0.03-0.39       H               

 

                                                    BASO x10^3 (test code 

= 704-7)        0.05 10*3/uL    0.01-0.07                       

 

                                                    Lab Interpretation 

(test code = 62396-1) Abnormal                                        





[facility] 



                      Test Item  Value      Reference Range Interpretation Comme

nts

 

                                                    SODIUM (test code = 

NA)             139 MMOL/L      136-143         N               

 

                                                    POTASSIUM (test 

code = K)       4.3 MMOL/L      3.5-5.1         N               

 

                                                    CHLORIDE (test code 

= CL)           103 MMOL/L                N               

 

                                                    CARBON DIOXIDE 

(test code = CO2) 24 mmol/L       24-31           N               

 

                                                    GLUCOSE (test code 

= GLU)          147 mg/dL                 H               

 

                                                    BLOOD UREA NITROGEN 

(test code = BUN) 11.1 MG/DL      7.0-21.0        N               

 

                                                    GLOMERULAR 

FILTRATION RATE 

(test code = GFR)                       >=60 max 

estimate            >60                                     The estimated 

glomerular 

filtration rate is 

computed 

usingpatient race, 

age (>18), sex, and 

serum creatinine. 

If anyof the needed 

data elements are 

missing the 

Laboratory cannot 

compute an 

estimation of the 

glomerular 

filtration rate.

 

                                                    CREATININE (test 

code = CREAT)   0.5 mg/dL       0.8-1.5         L               

 

                                                    ALBUMIN (test code 

= ALB)          4.2 G/DL        3.5-5.0         N               

 

                                                    CALCIUM (test code 

= CA)           8.7 mg/dL       8.8-10.2        L               

 

                                                    PHOSPHOROUS (test 

code = PHOS)    3.5 mg/dL       2.7-4.5         N               





GLUBED2019-10-25 00:22:00* 



                      Test Item  Value      Reference Range Interpretation Comme

nts

 

                      GLUBED (test code = GLUBED) 150 MG/DL       H       

   





GLUBED2019-10-24 18:17:00* 



                      Test Item  Value      Reference Range Interpretation Comme

nts

 

                      GLUBED (test code = GLUBED) 148 MG/DL       H       

   





UR HCG QUAL2019-10-24 12:12:00* 



                      Test Item  Value      Reference Range Interpretation Comme

nts

 

                      UR HCG QUAL (test code = HCGQLU) NEGATIVE   NEGATIVE      

        









Notes





                          Date/Time    Note         Provider     Source

 

                                        2023 13:30:00 Addended by: GRANT RODNEY MD on: 

2023 02:58 PM



Modules accepted: Orders





Electronically signed by Grant Rodney MD at 2023 2:58 PM CDT

                                                    [facility]

## 2025-03-11 NOTE — RAD REPORT
EXAMINATION: ONE VIEW CHEST XR



CLINICAL INDICATION: Cough;Congestion



TECHNIQUE: Frontal chest projection is submitted. Examination is limited by patient positioning and t
echnique.



COMPARISON: 11/30/2020



FINDINGS:



The lungs are well inflated and clear. The heart is upper limit of normal in size. No displaced fract
ures identified.



IMPRESSION: 



No acute intrathoracic abnormalities. 







Reported By: Rubén Duarte 

Electronically Signed:  3/11/2025 6:15 PM

## 2025-03-11 NOTE — ER
Nurse's Notes                                                                                     

 [facility]                                                                 

Name: Kiki Lockhart                                                                                

Age: 65 yrs                                                                                       

Sex: Female                                                                                       

: 1960                                                                                   

MRN: [id number]                                                                                   

Arrival Date: 2025                                                                          

Time: 17:07                                                                                       

Account#: [id number]                                                                            

Bed 5                                                                                             

Private MD:                                                                                       

Diagnosis: Unspecified asthma with (acute) exacerbation                                           

                                                                                                  

Presentation:                                                                                     

                                                                                             

17:16 Chief complaint: Patient states: "i have been wanting to come for about a month, i have ap3 

      been having trouble breathing and stuff." patient reports that "the other day, i got        

      got knot in my stomach and it took me a while to catch my breath, it wasn't the first       

      time it happened and it scared me." patient reports cough/congestion in the morning.        

      patient reports "diarrhea sometimes." patient denies pain. in regards to the vaginal        

      complaint. patient states "i'm always wet. so i thought i would mention it.".               

      Coronavirus screen: At this time, the client does not indicate any symptoms associated      

      with coronavirus-19. Ebola Screen: No symptoms or risks identified at this time.            

      Initial Sepsis Screen: Does the patient meet any 2 criteria? No. Patient's initial          

      sepsis screen is negative. Does the patient have a suspected source of infection? No.       

      Patient's initial sepsis screen is negative. Risk Assessment: Do you want to hurt           

      yourself or someone else? Patient reports no desire to harm self or others. Onset of        

      symptoms is unknown.                                                                        

17:16 Method Of Arrival: Ambulatory                                                           ap3 

17:16 Acuity: ANDRE 3                                                                           ap3 

                                                                                                  

Triage Assessment:                                                                                

17:19 General: Appears in no apparent distress. Behavior is calm, cooperative, appropriate    ap3 

      for age. Pain: Denies pain. Neuro: Level of Consciousness is awake, alert, obeys            

      commands, Oriented to person, place, time, situation. Cardiovascular: Patient's skin is     

      warm and dry. Respiratory: Reports cough that is Airway is patent Respiratory effort is     

      even, unlabored, Respiratory pattern is regular, symmetrical, Onset: The                    

      symptoms/episode began/occurred. Respiratory: the patient has mild shortness of breath.     

      : Reports "im always wet.".                                                               

                                                                                                  

Historical:                                                                                       

- Allergies:                                                                                      

17:19 No Known Allergies;                                                                     ap3 

- PMHx:                                                                                           

17:19 Asthma;                                                                                 ap3 

                                                                                                  

- Immunization history:: Client reports receiving the 2nd dose of the Covid vaccine,              

  Flu vaccine status is unknown.                                                                  

- Infectious Disease History:: Denies.                                                            

- Social history:: Smoking status: Patient/guardian denies using tobacco.                         

                                                                                                  

                                                                                                  

Screenin:20 [facility] ED Fall Risk Assessment (Adult) History of falling in the last 3 months,       ap3 

      including since admission Yes- single mechanical fall (1 pt) Confusion or                   

      Disorientation No (0 pts) Intoxicated or Sedated No (0 pts) Impaired Gait No (0 pts)        

      Mobility Assist Device Used No (0 pt) Altered Elimination No (0 pt) Score/Fall Risk         

      Level 0 - 2 = Low Risk Oriented to surroundings, Maintained a safe environment,             

      Educated pt \T\ family on fall prevention, incl call for assistance when getting out of     

      bed, Assessed \T\ reinforced patient's understanding of fall precautions, Hourly rounding   

      (assess needs \T\ fall precautionary measures) done, Used ambulatory aids as needed         

      (educated on \T\ assisted with). Abuse screen: Denies threats or abuse. Nutritional         

      screening: No deficits noted. Tuberculosis screening: No symptoms or risk factors           

      identified.                                                                                 

                                                                                                  

Assessment:                                                                                       

18:25 General: Appears in no apparent distress. comfortable, Behavior is calm, cooperative,   ld1 

      appropriate for age. Pain: Denies pain. Neuro: Level of Consciousness is awake, alert,      

      obeys commands, Oriented to person, place, time, situation. Cardiovascular: Capillary       

      refill < 3 seconds Patient's skin is warm and dry. Rhythm is sinus rhythm. Respiratory:     

      Airway is patent Respiratory effort is even, unlabored, Breath sounds are clear             

      bilaterally. the patient has mild shortness of breath. GI: Abdomen is flat,                 

      non-distended. : No signs and/or symptoms were reported regarding the genitourinary       

      system. EENT: No signs and/or symptoms were reported regarding the EENT system. Derm:       

      No signs and/or symptoms reported regarding the dermatologic system. Musculoskeletal:       

      No signs and/or symptoms reported regarding the musculoskeletal system.                     

                                                                                                  

Vital Signs:                                                                                      

17:16  / 85; Pulse 89; Resp 18; Temp 98.2(O); Pulse Ox 96% on R/A; Weight 90.72 kg;     ap3 

      Height 5 ft. 3 in. ; Pain 0/10;                                                             

18:25  / 76; Pulse 84; Resp 18; Pulse Ox 97% on R/A;                                    ld1 

20:56  / 72; Pulse 82; Resp 16; Temp 98.1; Pulse Ox 100% ;                              dd2 

17:16 Body Mass Index 35.43 (90.72 kg, 160.02 cm)                                             ap3 

17:16 Pain Scale: Adult                                                                       ap3 

                                                                                                  

ED Course:                                                                                        

17:10 Patient arrived in ED.                                                                  cj3 

17:13 [facility]                                                        kb  

17:13 Vern Geiger MD is Attending Physician.                                                kb  

17:19 Triage completed.                                                                       ap3 

17:21 Arm band placed on left wrist.                                                          ap3 

18:06 Initial lab(s) drawn, by me, sent to lab. Inserted saline lock: 20 gauge in right       zm  

      forearm, using aseptic technique. Blood collected. Flushed with 10 mL NS.                   

18:07 COVID-19 Ag + Flu A+B Ag Sent.                                                          zm  

18:07 BMP Sent.                                                                               zm  

18:07 CBC with Diff Sent.                                                                     zm  

18:13 Chest Single View XRAY In Process Unspecified.                                          EDMS

18:24 Chyna Plascencia, RN is Primary Nurse.                                                      ld1 

18:26 Patient has correct armband on for positive identification. Placed in gown. Bed in low  ld1 

      position. Call light in reach. Side rails up X2. Pulse ox on. NIBP on. Door closed.         

      Noise minimized. Warm blanket given.                                                        

18:26 No provider procedures requiring assistance completed.                                  ld1 

20:58 Provided Education on: D/C EDUCATION.                                                   dd2 

20:58 IV discontinued, intact, bleeding controlled, No redness/swelling at site. Pressure     dd2 

      dressing applied.                                                                           

                                                                                                  

Administered Medications:                                                                         

18:24 Drug: Albuterol Inhalation 2.5 mg Inhalation once Route: Inhalation;                    ld1 

19:10 Follow up: Response: No adverse reaction                                                dd2 

18:24 Drug: Ipratropium Inhalation Aerosol 0.5 mg Inhalation once Route: Inhalation;          ld1 

19:10 Follow up: Response: No adverse reaction                                                dd2 

18:24 Drug: Magnesium Sulfate IVPB 1 grams IVPB once over 1 hrs Route: IVPB; Infused Over: 1  ld1 

      hrs; Site: right forearm;                                                                   

19:30 Follow up: IV Status: Completed infusion                                                dd2 

18:24 Drug: MethylPrednisoLONE  mg IVP once Route: IVP; Site: right forearm;           ld1 

19:10 Follow up: Response: No adverse reaction                                                dd2 

                                                                                                  

                                                                                                  

Medication:                                                                                       

18:26 VIS not applicable for this client.                                                     ld1 

                                                                                                  

Outcome:                                                                                          

20:00 Discharge ordered by MD.                                                                aston  

20:58 Discharged to home ambulatory,                                                          dd2 

20:58 Condition: stable                                                                           

20:58 Discharge instructions given to patient, Instructed on discharge instructions, follow       

      up and referral plans. medication usage, Demonstrated understanding of instructions,        

      follow-up care, medications, Prescriptions given X 3,                                       

20:59 Patient left the ED.                                                                    dd2 

                                                                                                  

Signatures:                                                                                       

Dispatcher [employer]                           EDMS                                                 

Esetla Rodney, Aiyana Kearns RN                    RN   ap3                                                  

Chyna Plascencia RN                        RN   ld1                                                  

Marsha Gilliland DIANA, RN                        RN   dd2                                                  

Kary Hammer                             cj3                                                  

                                                                                                  

Corrections: (The following items were deleted from the chart)                                    

17:20 17:16 Chief complaint: Patient states: "i have been wanting to come for about a month,  ap3 

      i have been having trouble breathing and stuff." patient reports that "the other day, i     

      got got knot in my stomach and it took me a while to catch my breath, it wasn't the         

      first time it happened and it scared me." patient reports cough/congestion in the           

      morning. patient reports "diarrhea sometimes." patient denies pain. ap3                     

                                                                                                  

**************************************************************************************************

## 2025-03-11 NOTE — EDPHYS
Physician Documentation                                                                           

 [facility]                                                                 

Name: Kiki Lockhart                                                                                

Age: 65 yrs                                                                                       

Sex: Female                                                                                       

: 1960                                                                                   

MRN: [id number]                                                                                   

Arrival Date: 2025                                                                          

Time: 17:07                                                                                       

Account#: [id number]                                                                            

Bed 5                                                                                             

Private MD:                                                                                       

ED Physician Vern Geiger                                                                         

HPI:                                                                                              

                                                                                             

20:43 This 65 yrs old  Female presents to ER via Ambulatory with complaints of        kb  

      Breathing Difficulty, Vaginal Pain, Vaginal Itching.                                        

20:43 Pt is a 65 year old female who presents for shortness of breath, wheezing and cough     kb  

      that started about one month ago. States symptoms aren't improving. Reports history of      

      asthma. Denies fever.                                                                       

                                                                                                  

Historical:                                                                                       

- Allergies:                                                                                      

17:19 No Known Allergies;                                                                     ap3 

- PMHx:                                                                                           

17:19 Asthma;                                                                                 ap3 

                                                                                                  

- Immunization history:: Client reports receiving the 2nd dose of the Covid vaccine,              

  Flu vaccine status is unknown.                                                                  

- Infectious Disease History:: Denies.                                                            

- Social history:: Smoking status: Patient/guardian denies using tobacco.                         

                                                                                                  

                                                                                                  

ROS:                                                                                              

20:43 Constitutional: As per HPI                                                              kb  

                                                                                                  

Exam:                                                                                             

20:43 Constitutional:  This is a well developed, well nourished patient who is awake, alert,  kb  

      and in no acute distress. Head/Face:  Normocephalic, atraumatic. ENT:  Moist Mucous         

      membranes Cardiovascular:  Regular rate  Skin:  Warm, dry with normal turgor.  Normal       

      color. MS/ Extremity:  Pulses equal, no cyanosis.  Neurovascular intact.  Full, normal      

      range of motion. Neuro:  Awake and alert, GCS 15, oriented to person, place, time, and      

      situation.                                                                                  

20:43 Respiratory: the patient does not display signs of respiratory distress,  Respirations:     

      normal, Breath sounds: wheezing: inspiratory expiratory that is moderate, is heard          

      diffusely,                                                                                  

                                                                                                  

Vital Signs:                                                                                      

17:16  / 85; Pulse 89; Resp 18; Temp 98.2(O); Pulse Ox 96% on R/A; Weight 90.72 kg;     ap3 

      Height 5 ft. 3 in. ; Pain 0/10;                                                             

18:25  / 76; Pulse 84; Resp 18; Pulse Ox 97% on R/A;                                    ld1 

20:56  / 72; Pulse 82; Resp 16; Temp 98.1; Pulse Ox 100% ;                              dd2 

17:16 Body Mass Index 35.43 (90.72 kg, 160.02 cm)                                             ap3 

17:16 Pain Scale: Adult                                                                       ap3 

                                                                                                  

MDM:                                                                                              

17:13 Medical Screening Exam initiated                                                        kb  

20:45 Differential diagnosis: asthma, Bronchitis Chronic Obstructive Pulmonary Disease        kb  

      pneumonia, asthma. Data reviewed: vital signs, nurses notes. Counseling: I had a            

      detailed discussion with the patient and/or guardian regarding the historical points,       

      exam findings, and any diagnostic results supporting the discharge/admit diagnosis, lab     

      results, radiology results, the need for outpatient follow up, a family practitioner,       

      to return to the emergency department if symptoms worsen or persist or if there are any     

      questions or concerns that arise at home.                                                   

20:46 Response to treatment: the patient's symptoms have markedly improved after treatment.   kb  

                                                                                                  

                                                                                             

17:52 Order name: CBC with Diff; Complete Time: 18:46                                         kb  

                                                                                             

17:52 Order name: BMP; Complete Time: 18:55                                                   kb  

                                                                                             

17:52 Order name: COVID-19 Ag + Flu A+B Ag; Complete Time: 18:46                              kb  

                                                                                             

17:52 Order name: Chest Single View XRAY; Complete Time: 18:20                                kb  

                                                                                             

17:52 Order name: IV Start; Complete Time: 18:07                                              kb  

                                                                                                  

Administered Medications:                                                                         

18:24 Drug: Albuterol Inhalation 2.5 mg Inhalation once Route: Inhalation;                    ld1 

19:10 Follow up: Response: No adverse reaction                                                dd2 

18:24 Drug: Ipratropium Inhalation Aerosol 0.5 mg Inhalation once Route: Inhalation;          ld1 

19:10 Follow up: Response: No adverse reaction                                                dd2 

18:24 Drug: Magnesium Sulfate IVPB 1 grams IVPB once over 1 hrs Route: IVPB; Infused Over: 1  ld1 

      hrs; Site: right forearm;                                                                   

19:30 Follow up: IV Status: Completed infusion                                                dd2 

18:24 Drug: MethylPrednisoLONE  mg IVP once Route: IVP; Site: right forearm;           ld1 

19:10 Follow up: Response: No adverse reaction                                                dd2 

                                                                                                  

                                                                                                  

Disposition Summary:                                                                              

25 20:00                                                                                    

Discharge Ordered                                                                                 

 Notes:       Location: Home                                                                        
  kb

      Condition: Stable                                                                       kb  

      Diagnosis                                                                                   

        - Unspecified asthma with (acute) exacerbation                                        kb  

      Followup:                                                                               kb  

        - With: Emergency Department                                                               

        - When: As needed                                                                          

        - Reason: Worsening of condition                                                           

      Followup:                                                                               kb  

        - With: Private Physician                                                                  

        - When: 2 - 3 days                                                                         

        - Reason: Recheck today's complaints, Continuance of care, Re-evaluation by your           

      physician                                                                                   

      Discharge Instructions:                                                                     

        - Discharge Summary Sheet                                                             kb  

        - Asthma, Adult, Easy-to-Read                                                         kb  

      Forms:                                                                                      

        - Medication Reconciliation Form                                                      kb  

        - Antibiotic Education                                                                kb  

        - Prescription Opioid Use                                                             kb  

        - Patient Portal Instructions                                                         kb  

        - Leadership Thank You Letter                                                         kb  

      Prescriptions:                                                                              

        - albuterol sulfate 90 mcg/actuation Inhalation HFA Aerosol Inhaler                        

            - inhale 2 puff INHALATION route every 4-6 hours As needed; 1 unit; Refills: 0,   kb  

      Product Selection Permitted                                                                 

        - Prednisone 20 mg Oral Tablet                                                             

            - take 1 tablet ORAL route once daily for 5 days; 5 tablet; Refills: 0, Product   kb  

      Selection Permitted                                                                         

        - Albuterol Sulfate 2.5 mg /3 mL (0.083 %) Inhalation Solution for Nebulization            

            - inhale 1 unit NEBULIZATION route every 8 hours As needed; 1 Unspecified;        kb  

      Refills: 0, Product Selection Permitted                                                     

Signatures:                                                                                       

Dispatcher [employer]                           Estela Haynes, Aiyana Kearns RN                    RN   ap3                                                  

Chyna Plascencia RN                        RN   ld1                                                  

SJ RICHARD RN   dd2                                                  

                                                                                                  

**************************************************************************************************